# Patient Record
Sex: MALE | Race: WHITE | NOT HISPANIC OR LATINO | Employment: OTHER | URBAN - METROPOLITAN AREA
[De-identification: names, ages, dates, MRNs, and addresses within clinical notes are randomized per-mention and may not be internally consistent; named-entity substitution may affect disease eponyms.]

---

## 2017-04-18 ENCOUNTER — OFFICE VISIT (OUTPATIENT)
Dept: DERMATOLOGY | Facility: CLINIC | Age: 77
End: 2017-04-18

## 2017-04-18 VITALS — DIASTOLIC BLOOD PRESSURE: 78 MMHG | SYSTOLIC BLOOD PRESSURE: 123 MMHG | HEART RATE: 72 BPM

## 2017-04-18 DIAGNOSIS — Z79.899 ENCOUNTER FOR LONG-TERM (CURRENT) USE OF HIGH-RISK MEDICATION: ICD-10-CM

## 2017-04-18 DIAGNOSIS — L73.9 FOLLICULITIS: Primary | ICD-10-CM

## 2017-04-18 DIAGNOSIS — L73.9 FOLLICULITIS: ICD-10-CM

## 2017-04-18 LAB
ALBUMIN SERPL-MCNC: 3.6 G/DL (ref 3.4–5)
ALP SERPL-CCNC: 92 U/L (ref 40–150)
ALT SERPL W P-5'-P-CCNC: 31 U/L (ref 0–70)
ANION GAP SERPL CALCULATED.3IONS-SCNC: 6 MMOL/L (ref 3–14)
AST SERPL W P-5'-P-CCNC: 27 U/L (ref 0–45)
BILIRUB SERPL-MCNC: 1 MG/DL (ref 0.2–1.3)
BUN SERPL-MCNC: 14 MG/DL (ref 7–30)
CALCIUM SERPL-MCNC: 8.8 MG/DL (ref 8.5–10.1)
CHLORIDE SERPL-SCNC: 107 MMOL/L (ref 94–109)
CO2 SERPL-SCNC: 28 MMOL/L (ref 20–32)
CREAT SERPL-MCNC: 0.94 MG/DL (ref 0.66–1.25)
ERYTHROCYTE [DISTWIDTH] IN BLOOD BY AUTOMATED COUNT: 12 % (ref 10–15)
GFR SERPL CREATININE-BSD FRML MDRD: 78 ML/MIN/1.7M2
GLUCOSE SERPL-MCNC: 102 MG/DL (ref 70–99)
HCT VFR BLD AUTO: 44.9 % (ref 40–53)
HGB BLD-MCNC: 15.4 G/DL (ref 13.3–17.7)
MCH RBC QN AUTO: 33.1 PG (ref 26.5–33)
MCHC RBC AUTO-ENTMCNC: 34.3 G/DL (ref 31.5–36.5)
MCV RBC AUTO: 97 FL (ref 78–100)
PLATELET # BLD AUTO: 205 10E9/L (ref 150–450)
POTASSIUM SERPL-SCNC: 4.5 MMOL/L (ref 3.4–5.3)
PROT SERPL-MCNC: 6.4 G/DL (ref 6.8–8.8)
RBC # BLD AUTO: 4.65 10E12/L (ref 4.4–5.9)
SODIUM SERPL-SCNC: 141 MMOL/L (ref 133–144)
WBC # BLD AUTO: 6.3 10E9/L (ref 4–11)

## 2017-04-18 ASSESSMENT — PAIN SCALES - GENERAL: PAINLEVEL: NO PAIN (0)

## 2017-04-18 NOTE — LETTER
4/18/2017       RE: Tee Larkin  [address]  [address]  [facility] [address]     Dear Colleague,    Thank you for referring your patient, Tee Larkin, to the [facility] DERMATOLOGY at [facility]. Please see a copy of my visit note below.    DERMATOLOGY PROBLEM LIST:   1.  Pityrosporum folliculitis with rosacea component, involvement of scalp and face.  Pityrosporum biopsy proven many years ago.  Current treatment Bactrim double strength, will attempt to decrease to 1 pill every other day.  Repeat CBC and CMP today, 04/18/2017.  Ketoconazole 2% shampoo.   2.  Seborrheic dermatitis, 2% ketoconazole shampoo.      CHIEF COMPLAINT:  Followup.      HISTORY OF PRESENT ILLNESS:  The patient is a 77-year-old male well known to me who has had biopsy-proven pityrosporum folliculitis for many years.  He has done very well on Bactrim.  Attempts to wean off Bactrim completely in the past had resulted in a large flares.  There is a rosacea component.  He has trigger factors such as nuts which will bring out pimples.  Currently is taking Bactrim double strength 1 daily and ketoconazole shampoo.  He is doing well.  Even though he occasionally does indulge in some of his trigger factors, he has not had any flares.  He is tolerating the medication well, no GI upset, no difficulty with sunburns.      PHYSICAL EXAMINATION:  The patient is a well-appearing male in no acute distress.  Please note the nursing note for vital signs.  He has mild erythema of the scalp, no inflammatory papules, pustules or scale.  Face today has mild erythema of the nose with some sebaceous hyperplasia.        ASSESSMENT AND PLAN:   1.  Pityrosporum folliculitis with rosacea component.  At this point, over the past 6 months he has done well, we will decrease his Bactrim to 1 every other day.  I did write the prescription still for daily should he feel he needs to stay on it.  He will otherwise continue the  ketoconazole shampoo.   2.  Seborrheic dermatitis.  Continue the ketoconazole shampoo.  I will check safety labs today, a CBC and comprehensive metabolic panel, and then plan to see the patient back in 6 months.         Again, thank you for allowing me to participate in the care of your patient.      Sincerely,    Charlene Xiong MD

## 2017-04-18 NOTE — MR AVS SNAPSHOT
After Visit Summary   4/18/2017    Tee Larkin    MRN: [id number]           Patient Information     Date Of Birth          1940        Visit Information        Provider Department      4/18/2017 9:45 AM Charlene Xiong MD [facility] Dermatology        Today's Diagnoses     Folliculitis    -  1    Encounter for long-term (current) use of high-risk medication           Follow-ups after your visit        Your next 10 appointments already scheduled     Apr 18, 2017 10:30 AM CDT   LAB with  LAB   [facility] ([facility])    [address] [place] [id number] [phone]           Patient must bring picture ID.  Patient should be prepared to give a urine specimen  Please do not eat 10-12 hours before your appointment if you are coming in fasting for labs on lipids, cholesterol, or glucose (sugar).  Pregnant women should follow their Care Team instructions. Water with medications is okay. Do not drink coffee or other fluids.   If you have concerns about taking  your medications, please ask at office or if scheduling via HowGood, send a message by clicking on Secure Messaging, Message Your Care Team.              Future tests that were ordered for you today     Open Future Orders        Priority Expected Expires Ordered    CBC with platelets Routine  4/18/2018 4/18/2017    Comprehensive metabolic panel Routine  4/18/2018 4/18/2017            Who to contact     Please call your clinic at [phone] to:    Ask questions about your health    Make or cancel appointments    Discuss your medicines    Learn about your test results    Speak to your doctor   If you have compliments or concerns about an experience at your clinic, or if you wish to file a complaint, please contact [facility] Patient Relations at [phone] or email us at [email or web address]         Additional Information About Your Visit         Endocyte Information     Endocyte is an electronic gateway that provides easy, online access to your medical records. With Endocyte, you can request a clinic appointment, read your test results, renew a prescription or communicate with your care team.     To sign up for Endocyte visit the website at [email or web address]   You will be asked to enter the access code listed below, as well as some personal information. Please follow the directions to create your username and password.     Your access code is: [email or web address]  Expires: 7/3/2017  6:30 AM     Your access code will  in 90 days. If you need help or a new code, please contact your [facility] or call [phone] for assistance.        Care EveryWhere ID     This is your Care EveryWhere ID. This could be used by other organizations to access your [facility] medical records  [id number]        Your Vitals Were     Pulse                   72            Blood Pressure from Last 3 Encounters:   17 123/78   16 125/70   16 146/82    Weight from Last 3 Encounters:   16 86.6 kg (191 lb)   16 88.5 kg (195 lb 3.2 oz)   06/29/15 87.8 kg (193 lb 9.6 oz)               Primary Care Provider Office Phone # Fax #    Stephan Weiner [phone]       [facility] [address] [facility] [address]  [facility] [address]        Thank you!     Thank you for choosing [facility] DERMATOLOGY  for your care. Our goal is always to provide you with excellent care. Hearing back from our patients is one way we can continue to improve our services. Please take a few minutes to complete the written survey that you may receive in the mail after your visit with us. Thank you!             Your Updated Medication List - Protect others around you: Learn how to safely use, store and throw away your medicines at www.disposemymeds.org.          This list is accurate as of: 17 10:04 AM.  Always use your most recent  med list.                   Brand Name Dispense Instructions for use    aspirin 81 MG tablet      Take 81 mg by mouth daily.       clobetasol 0.05 % external solution    TEMOVATE    59 mL    Apply several drops over scalp to spread think layer-15 minutes before shower Twice weekly       FREE & CLEAR Sham      Externally apply  topically. As directed       ketoconazole 2 % shampoo    NIZORAL    120 mL    Use as shampoo daily       LOSARTAN POTASSIUM PO      Take  by mouth.       metoprolol 25 MG tablet    LOPRESSOR     Take 25 mg by mouth 2 times daily.       nitroglycerin 0.4 MG sublingual tablet    NITROSTAT     Place 0.4 mg under the tongue every 5 minutes as needed.       omeprazole 20 MG tablet      Take 20 mg by mouth daily.       rosuvastatin 40 MG tablet    CRESTOR     Take 40 mg by mouth daily.       sulfamethoxazole-trimethoprim 800-160 MG per tablet    BACTRIM DS/SEPTRA DS    30 tablet    Take 1 tablet by mouth daily

## 2017-04-18 NOTE — NURSING NOTE
"Dermatology Rooming Note    Tee Larkin's goals for this visit include:   Chief Complaint   Patient presents with     Derm Problem     Tee comes to clinic today for scalp folliculitis. States \"I think it's doing good.\"     Wilda Munoz, [facility]    "

## 2017-04-18 NOTE — PROGRESS NOTES
DERMATOLOGY PROBLEM LIST:   1.  Pityrosporum folliculitis with rosacea component, involvement of scalp and face.  Pityrosporum biopsy proven many years ago.  Current treatment Bactrim double strength, will attempt to decrease to 1 pill every other day.  Repeat CBC and CMP today, 04/18/2017.  Ketoconazole 2% shampoo.   2.  Seborrheic dermatitis, 2% ketoconazole shampoo.      CHIEF COMPLAINT:  Followup.      HISTORY OF PRESENT ILLNESS:  The patient is a 77-year-old male well known to me who has had biopsy-proven pityrosporum folliculitis for many years.  He has done very well on Bactrim.  Attempts to wean off Bactrim completely in the past had resulted in a large flares.  There is a rosacea component.  He has trigger factors such as nuts which will bring out pimples.  Currently is taking Bactrim double strength 1 daily and ketoconazole shampoo.  He is doing well.  Even though he occasionally does indulge in some of his trigger factors, he has not had any flares.  He is tolerating the medication well, no GI upset, no difficulty with sunburns.      PHYSICAL EXAMINATION:  The patient is a well-appearing male in no acute distress.  Please note the nursing note for vital signs.  He has mild erythema of the scalp, no inflammatory papules, pustules or scale.  Face today has mild erythema of the nose with some sebaceous hyperplasia.        ASSESSMENT AND PLAN:   1.  Pityrosporum folliculitis with rosacea component.  At this point, over the past 6 months he has done well, we will decrease his Bactrim to 1 every other day.  I did write the prescription still for daily should he feel he needs to stay on it.  He will otherwise continue the ketoconazole shampoo.   2.  Seborrheic dermatitis.  Continue the ketoconazole shampoo.  I will check safety labs today, a CBC and comprehensive metabolic panel, and then plan to see the patient back in 6 months.

## 2017-04-18 NOTE — PROGRESS NOTES
"[facility] Dermatology Note      Dermatology Problem List:  1. Pityrosporum folliculitis with a rosacea component  -Scalp and face (pityrosporum bx proven many years ago)  -Continue bactrim DS, 1 pill QDay (pt has previously been off Bactrim and has been on higher doses)   -Repeat CBC and CMP at next visit (March 2017)  -Ketoconazole 2% shampoo  -No specific medications for rosacea at this time (his rosacea flares with chocolate and nuts)    2. Seb derm  -2% ketoconazole shampoo    Encounter Date: Apr 18, 2017    CC:   Chief Complaint   Patient presents with     Derm Problem     Tee comes to clinic today for scalp folliculitis. States \"I think it's doing good.\"         History of Present Illness:  Mr. Tee Larkin is a 77 year old male who presents as a follow-up for scalp folliculitis. The patient was last seen in March 2016. Since his last visit,t he pt states his dz is well controlled with Bactrim and ketoconazole shampoo. The pt states he is using the shampoo daily, and he leaves it in for approximately 5 minutes. The pt states the last time he noticed any bumps on his scalp was over a month ago, but even then, there were not that many. Overall, pt feels well, and he denies any other skin complaints today.     Past Medical History:   Patient Active Problem List   Diagnosis     Other specified disease of hair and hair follicles     History reviewed. No pertinent past medical history.  History reviewed. No pertinent surgical history.    Medications:  Current Outpatient Prescriptions   Medication Sig Dispense Refill     ketoconazole (NIZORAL) 2 % shampoo Use as shampoo daily 120 mL 11     sulfamethoxazole-trimethoprim (BACTRIM DS,SEPTRA DS) 800-160 MG per tablet Take 1 tablet by mouth daily 30 tablet 5     clobetasol (TEMOVATE) 0.05 % external solution Apply several drops over scalp to spread think layer-15 minutes before shower Twice weekly 59 mL 6     aspirin 81 MG tablet Take 81 mg by " mouth daily.       LOSARTAN POTASSIUM PO Take  by mouth.       rosuvastatin (CRESTOR) 40 MG tablet Take 40 mg by mouth daily.       nitroGLYCERIN (NITROSTAT) 0.4 MG SL tablet Place 0.4 mg under the tongue every 5 minutes as needed.       Shampoos (FREE & CLEAR) SHAM Externally apply  topically. As directed       metoprolol (LOPRESSOR) 25 MG tablet Take 25 mg by mouth 2 times daily.       Omeprazole 20 MG tablet Take 20 mg by mouth daily.          Allergies   Allergen Reactions     Penicillins Hives     Chocolate Rash     Nuts Rash       Review of Systems:  -As per HPI  -Constitutional: The patient denies fatigue, fevers, chills, unintended weight loss, and night sweats.  -HEENT: Patient denies nonhealing oral sores.  -Skin: As above in HPI. No additional skin concerns.    Physical exam:  Vitals: /78 (BP Location: Right arm, Patient Position: Chair, Cuff Size: Adult Regular)  Pulse 72  GEN: This is a well developed, well-nourished male in no acute distress, in a pleasant mood.    SKIN: Focused examination of the scalp and face was performed.  -No papules or pustules on the scalp are appreciated. There is mild erythema of the scalp skin with minimal seborrhea  -No erythema or scaling of the face is appreciated.  -No other lesions of concern on areas examined.     Impression/Plan:  1. Pityrosporum folliculitis with a rosacea component    Because pt had a flare a month ago, we do not feel comfortable stopping the Bactrim at this time    Continue Bactrim DS one pill QDay and ketoconazole 2% shampoo    No specific medications for rosacea at this time (his rosacea is triggered by chocolate and nuts)    2. Seborrheic dermatitis    Benign nature was discussed. No further intervention required at this time.     Continue 2% ketoconazole shampoo      Follow-up in 6 months, earlier for new or changing lesions.        staffed the patient.    Staff Involved:  Resident(Phil Crump)/Staff(as above)  .I was  present for key portions of the history and exam.  See resident note for pertinent history, exam, and treatment plan.  Charlene Xiong MD

## 2017-05-31 DIAGNOSIS — L73.8 BACTERIAL FOLLICULITIS: ICD-10-CM

## 2017-05-31 RX ORDER — SULFAMETHOXAZOLE/TRIMETHOPRIM 800-160 MG
1 TABLET ORAL DAILY
Qty: 30 TABLET | Refills: 5 | OUTPATIENT
Start: 2017-05-31

## 2017-05-31 NOTE — TELEPHONE ENCOUNTER
Last visit: 4/18/17  Next visit: Recall for 10/18/17    ASSESSMENT AND PLAN:   1.  Pityrosporum folliculitis with rosacea component.  At this point, over the past 6 months he has done well, we will decrease his Bactrim to 1 every other day.  I did write the prescription still for daily should he feel he needs to stay on it.  He will otherwise continue the ketoconazole shampoo.   2.  Seborrheic dermatitis.  Continue the ketoconazole shampoo.  I will check safety labs today, a CBC and comprehensive metabolic panel, and then plan to see the patient back in 6 months.

## 2017-05-31 NOTE — TELEPHONE ENCOUNTER
If pt has ongoing folliculitis, he should see his doctor. Bactrim is not a long term solution. Please schedule sooner f/u

## 2017-06-05 NOTE — TELEPHONE ENCOUNTER
Daniel,  Please reorder (per Dr. Xiong)    Last visit: 4/18/17  Next visit: Recall for 10/18/17     ASSESSMENT AND PLAN:   1.  Pityrosporum folliculitis with rosacea component.  At this point, over the past 6 months he has done well, we will decrease his Bactrim to 1 every other day.  I did write the prescription still for daily should he feel he needs to stay on it.  He will otherwise continue the ketoconazole shampoo.   2.  Seborrheic dermatitis.  Continue the ketoconazole shampoo.  I will check safety labs today, a CBC and comprehensive metabolic panel, and then plan to see the patient back in 6 months.

## 2017-06-06 RX ORDER — SULFAMETHOXAZOLE/TRIMETHOPRIM 800-160 MG
1 TABLET ORAL DAILY
Qty: 30 TABLET | Refills: 5 | Status: SHIPPED | OUTPATIENT
Start: 2017-06-06 | End: 2017-10-31

## 2017-06-27 ENCOUNTER — TELEPHONE (OUTPATIENT)
Dept: DERMATOLOGY | Facility: CLINIC | Age: 77
End: 2017-06-27

## 2017-06-27 NOTE — TELEPHONE ENCOUNTER
I called to get magda scheduled with Dr. LAWTON. He did not answer but I left a VM for him to give us a call back. July 3rd 2 9:15 tell pt to come at 9:00

## 2017-06-27 NOTE — TELEPHONE ENCOUNTER
----- Message from Charlene Xiong MD sent at 6/27/2017 12:44 PM CDT -----  Regarding: RE: Pt Appt Request - Dr Xiong  Contact: [phone]  Ok to overbook  ----- Message -----     From: Rosa Goldsmith CMA     Sent: 6/27/2017  10:43 AM       To: Charlene Xiong MD  Subject: FW: Pt Appt Request - Dr Xiong                Would you like to have him added on?  ----- Message -----     From: Lizett Ko CMA     Sent: 6/27/2017  10:34 AM       To: Rosa Goldsmith CMA  Subject: FW: Pt Appt Request - Dr Xiong                    ----- Message -----     From: Alida Mckee     Sent: 6/27/2017   9:51 AM       To: Derm Triage-Ump  Subject: Pt Appt Request - Dr Xiong                    The pt needs to see Dr Xiong regarding his Bactrim use. He was taking Bactrim and developed a cough while in [place] so he quit taking it. He coughed for 4 weeks then it ended, so he started the Bactrim again.  He thinks he should be seen as he is running out of Bactrim and he wants to know if it should be renewed, etc.  Please call the pt at [phone]    Servando Irwin    Please DO NOT send this message and/or reply back to sender.  Call Center Representatives DO NOT respond to messages.

## 2017-06-28 ENCOUNTER — TELEPHONE (OUTPATIENT)
Dept: DERMATOLOGY | Facility: CLINIC | Age: 77
End: 2017-06-28

## 2017-06-28 NOTE — TELEPHONE ENCOUNTER
Patient states he was taking medication and later had developed a cough so he stopped taking but the cough persisted for a couple weeks after discontinuing. Patient's scalp folliculitis recurred from not taking medication so once cough cleared patient began taking medication again. No cough this time. Patient states he is out of medication but still has refills and was wondering if he needed to speak with Dr Xiong before continuing with medication or if it is okay for him to just refill med. Writer spoke with [facility] Pharmacist who states cough is a very rare side effect and circumstance would be hard to say it was medication since cough did not go away with discontinuation and did not come back after re-starting. Patient was going to ask his pharmacy questions and request a refill.    Routing to provider to ensure okay for patient to continue medication.

## 2017-10-31 ENCOUNTER — OFFICE VISIT (OUTPATIENT)
Dept: DERMATOLOGY | Facility: CLINIC | Age: 77
End: 2017-10-31

## 2017-10-31 DIAGNOSIS — L73.8 BACTERIAL FOLLICULITIS: Primary | ICD-10-CM

## 2017-10-31 DIAGNOSIS — L71.9 ACNE ROSACEA: ICD-10-CM

## 2017-10-31 DIAGNOSIS — L73.8 BACTERIAL FOLLICULITIS: ICD-10-CM

## 2017-10-31 DIAGNOSIS — L21.9 DERMATITIS, SEBORRHEIC: ICD-10-CM

## 2017-10-31 LAB
ALBUMIN SERPL-MCNC: 3.6 G/DL (ref 3.4–5)
ALP SERPL-CCNC: 87 U/L (ref 40–150)
ALT SERPL W P-5'-P-CCNC: 36 U/L (ref 0–70)
ANION GAP SERPL CALCULATED.3IONS-SCNC: 4 MMOL/L (ref 3–14)
AST SERPL W P-5'-P-CCNC: 26 U/L (ref 0–45)
BASOPHILS # BLD AUTO: 0.1 10E9/L (ref 0–0.2)
BASOPHILS NFR BLD AUTO: 0.7 %
BILIRUB SERPL-MCNC: 0.6 MG/DL (ref 0.2–1.3)
BUN SERPL-MCNC: 14 MG/DL (ref 7–30)
CALCIUM SERPL-MCNC: 8.5 MG/DL (ref 8.5–10.1)
CHLORIDE SERPL-SCNC: 106 MMOL/L (ref 94–109)
CO2 SERPL-SCNC: 29 MMOL/L (ref 20–32)
CREAT SERPL-MCNC: 0.86 MG/DL (ref 0.66–1.25)
DIFFERENTIAL METHOD BLD: NORMAL
EOSINOPHIL # BLD AUTO: 0.2 10E9/L (ref 0–0.7)
EOSINOPHIL NFR BLD AUTO: 3.1 %
ERYTHROCYTE [DISTWIDTH] IN BLOOD BY AUTOMATED COUNT: 12.2 % (ref 10–15)
GFR SERPL CREATININE-BSD FRML MDRD: 86 ML/MIN/1.7M2
GLUCOSE SERPL-MCNC: 82 MG/DL (ref 70–99)
HCT VFR BLD AUTO: 46.3 % (ref 40–53)
HGB BLD-MCNC: 15.4 G/DL (ref 13.3–17.7)
IMM GRANULOCYTES # BLD: 0.1 10E9/L (ref 0–0.4)
IMM GRANULOCYTES NFR BLD: 0.7 %
LYMPHOCYTES # BLD AUTO: 1.2 10E9/L (ref 0.8–5.3)
LYMPHOCYTES NFR BLD AUTO: 16.5 %
MCH RBC QN AUTO: 33 PG (ref 26.5–33)
MCHC RBC AUTO-ENTMCNC: 33.3 G/DL (ref 31.5–36.5)
MCV RBC AUTO: 99 FL (ref 78–100)
MONOCYTES # BLD AUTO: 0.8 10E9/L (ref 0–1.3)
MONOCYTES NFR BLD AUTO: 11.4 %
NEUTROPHILS # BLD AUTO: 4.7 10E9/L (ref 1.6–8.3)
NEUTROPHILS NFR BLD AUTO: 67.6 %
NRBC # BLD AUTO: 0 10*3/UL
NRBC BLD AUTO-RTO: 0 /100
PLATELET # BLD AUTO: 209 10E9/L (ref 150–450)
POTASSIUM SERPL-SCNC: 4.5 MMOL/L (ref 3.4–5.3)
PROT SERPL-MCNC: 6.4 G/DL (ref 6.8–8.8)
RBC # BLD AUTO: 4.67 10E12/L (ref 4.4–5.9)
SODIUM SERPL-SCNC: 140 MMOL/L (ref 133–144)
WBC # BLD AUTO: 7 10E9/L (ref 4–11)

## 2017-10-31 RX ORDER — SULFAMETHOXAZOLE/TRIMETHOPRIM 800-160 MG
0.5 TABLET ORAL DAILY
Qty: 30 TABLET | Refills: 5 | Status: SHIPPED | OUTPATIENT
Start: 2017-10-31 | End: 2018-02-05

## 2017-10-31 ASSESSMENT — PAIN SCALES - GENERAL: PAINLEVEL: NO PAIN (0)

## 2017-10-31 NOTE — PROGRESS NOTES
[facility] Dermatology Note      Dermatology Problem List:  1. Pityrosporum Folliculitis with rosacea component, involvement of scalp and face.   - Pityrosporum biopsy proven many years ago.  Bactrim DS 1 pill every day but will try to decrease to every other day.    - Last CBC and CMP, 04/18/2017, repeat today    - Ketoconazole 2% shampoo.     2.  Seborrheic dermatitis   - 2% ketoconazole shampoo.    Encounter Date: Oct 31, 2017    CC:   Chief Complaint   Patient presents with     Derm Problem     Tee is here for a folliculitis follow up.  States he notices a flare up on his scalp and face when he eat nuts.           History of Present Illness:  Mr. Tee Larkin is a 77 year old male who presents as a follow-up for pityrosporum folliculitis. The patient was last seen 4/17 when we asked him to decrease to Bactrim DS every other day though he does not recall this. He has been noticing some increasing memory difficulty recently and this is being evaluated by his PCP for this. 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 He called in late June saying that he flared after stopping the bactrim because of a cold. He does not recall this. He reports that overall his folliculitis, rosacea, and seborhheic dermatitis are all under good control, doing well with no scaling or itching of the scalp or face. He has no new concerns today.     Past Medical History:   Patient Active Problem List   Diagnosis     Other specified disease of hair and hair follicles     No past medical history on file.  No past surgical history on file.    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 The patient reports sun avoidance    Family History:  There is possible family history of skin cancer in his father.    Medications:  Current Outpatient Prescriptions   Medication Sig Dispense Refill     sulfamethoxazole-trimethoprim (BACTRIM DS/SEPTRA DS) 800-160 MG per tablet Take 1 tablet by mouth daily  30 tablet 5     ketoconazole (NIZORAL) 2 % shampoo Use as shampoo daily 120 mL 11     clobetasol (TEMOVATE) 0.05 % external solution Apply several drops over scalp to spread think layer-15 minutes before shower Twice weekly 59 mL 6     aspirin 81 MG tablet Take 81 mg by mouth daily.       LOSARTAN POTASSIUM PO Take  by mouth.       rosuvastatin (CRESTOR) 40 MG tablet Take 40 mg by mouth daily.       nitroGLYCERIN (NITROSTAT) 0.4 MG SL tablet Place 0.4 mg under the tongue every 5 minutes as needed.       Shampoos (FREE & CLEAR) SHAM Externally apply  topically. As directed       metoprolol (LOPRESSOR) 25 MG tablet Take 25 mg by mouth 2 times daily.       Omeprazole 20 MG tablet Take 20 mg by mouth daily.          Allergies   Allergen Reactions     Penicillins Hives     Chocolate Rash     Nuts Rash         Review of Systems:  -As per HPI  -Constitutional: The patient denies fatigue, fevers, chills, unintended weight loss, and night sweats.  -HEENT: Patient denies nonhealing oral sores.  -Skin: As above in HPI. No additional skin concerns.    Physical exam:  Vitals: There were no vitals taken for this visit.  GEN: This is a well developed, well-nourished, well-kempt male in no acute distress, in a pleasant mood.    SKIN: Focused examination of the face, scalp and hands was performed.  -there is no scaling, erythema or excoriations/erosions/papules on the scalp or face  -No other lesions of concern on areas examined.     Impression/Plan:  1. Pityrosporum Folliculitis with rosacea component, involvement of scalp and face. Improved   - Pityrosporum biopsy proven many years ago.  Bactrim DS 1 pill every day but will try to decrease to every other day.  (order changed to help with pt confusion/memory issues)   - Last CBC and CMP, 04/18/2017, repeat today    - Ketoconazole 2% shampoo.     2.  Seborrheic dermatitis, improved   - 2% ketoconazole shampoo.    Follow-up in 6 months, earlier for new or changing lesions.       Tyrese staffed the patient.    Staff Involved:  Resident(Berta Mckeon)/Staff(as above)  .I, Charlene Xiong MD, saw this patient with the resident and agree with the resident s findings and plan of care as documented in the resident s note.

## 2017-10-31 NOTE — LETTER
10/31/2017       RE: Tee Larkin  [address]  [address]  [facility] [address]     Dear Colleague,    Thank you for referring your patient, Tee Larkin, to the [facility] DERMATOLOGY at [facility]. Please see a copy of my visit note below.      [facility] Dermatology Note      Dermatology Problem List:  1. Pityrosporum Folliculitis with rosacea component, involvement of scalp and face.   - Pityrosporum biopsy proven many years ago.  Bactrim DS 1 pill every day but will try to decrease to every other day.    - Last CBC and CMP, 04/18/2017, repeat today    - Ketoconazole 2% shampoo.     2.  Seborrheic dermatitis   - 2% ketoconazole shampoo.    Encounter Date: Oct 31, 2017    CC:   Chief Complaint   Patient presents with     Derm Problem     Tee is here for a folliculitis follow up.  States he notices a flare up on his scalp and face when he eat nuts.           History of Present Illness:  Mr. Tee Larkin is a 77 year old male who presents as a follow-up for pityrosporum folliculitis. The patient was last seen 4/17 when we asked him to decrease to Bactrim DS every other day though he does not recall this. He has been noticing some increasing memory difficulty recently and this is being evaluated by his PCP for this. 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 He called in late June saying that he flared after stopping the bactrim because of a cold. He does not recall this. He reports that overall his folliculitis, rosacea, and seborhheic dermatitis are all under good control, doing well with no scaling or itching of the scalp or face. He has no new concerns today.     Past Medical History:   Patient Active Problem List   Diagnosis     Other specified disease of hair and hair follicles     No past medical history on file.  No past surgical history on file.    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 The patient  reports sun avoidance    Family History:  There is possible family history of skin cancer in his father.    Medications:  Current Outpatient Prescriptions   Medication Sig Dispense Refill     sulfamethoxazole-trimethoprim (BACTRIM DS/SEPTRA DS) 800-160 MG per tablet Take 1 tablet by mouth daily 30 tablet 5     ketoconazole (NIZORAL) 2 % shampoo Use as shampoo daily 120 mL 11     clobetasol (TEMOVATE) 0.05 % external solution Apply several drops over scalp to spread think layer-15 minutes before shower Twice weekly 59 mL 6     aspirin 81 MG tablet Take 81 mg by mouth daily.       LOSARTAN POTASSIUM PO Take  by mouth.       rosuvastatin (CRESTOR) 40 MG tablet Take 40 mg by mouth daily.       nitroGLYCERIN (NITROSTAT) 0.4 MG SL tablet Place 0.4 mg under the tongue every 5 minutes as needed.       Shampoos (FREE & CLEAR) SHAM Externally apply  topically. As directed       metoprolol (LOPRESSOR) 25 MG tablet Take 25 mg by mouth 2 times daily.       Omeprazole 20 MG tablet Take 20 mg by mouth daily.          Allergies   Allergen Reactions     Penicillins Hives     Chocolate Rash     Nuts Rash         Review of Systems:  -As per HPI  -Constitutional: The patient denies fatigue, fevers, chills, unintended weight loss, and night sweats.  -HEENT: Patient denies nonhealing oral sores.  -Skin: As above in HPI. No additional skin concerns.    Physical exam:  Vitals: There were no vitals taken for this visit.  GEN: This is a well developed, well-nourished, well-kempt male in no acute distress, in a pleasant mood.    SKIN: Focused examination of the face, scalp and hands was performed.  -there is no scaling, erythema or excoriations/erosions/papules on the scalp or face  -No other lesions of concern on areas examined.     Impression/Plan:  1. Pityrosporum Folliculitis with rosacea component, involvement of scalp and face. Improved   - Pityrosporum biopsy proven many years ago.  Bactrim DS 1 pill every day but will try to decrease  to every other day.  (order changed to help with pt confusion/memory issues)   - Last CBC and CMP, 04/18/2017, repeat today    - Ketoconazole 2% shampoo.     2.  Seborrheic dermatitis, improved   - 2% ketoconazole shampoo.    Follow-up in 6 months, earlier for new or changing lesions.     Dr. Xiong staffed the patient.    Staff Involved:  Resident(Berta Mckeon)/Staff(as above)  .I, Charlene Xiong MD, saw this patient with the resident and agree with the resident s findings and plan of care as documented in the resident s note.      Again, thank you for allowing me to participate in the care of your patient.      Sincerely,    Charlene Xiong MD

## 2017-10-31 NOTE — MR AVS SNAPSHOT
After Visit Summary   10/31/2017    Tee Larkin    MRN: [id number]           Patient Information     Date Of Birth          1940        Visit Information        Provider Department      10/31/2017 9:15 AM Charlene Xiong MD [facility] Dermatology        Today's Diagnoses     Bacterial folliculitis    -  1      Care Instructions    Take a half pill of bactrim per day every day          Follow-ups after your visit        Future tests that were ordered for you today     Open Future Orders        Priority Expected Expires Ordered    CBC with platelets differential Routine  10/31/2018 10/31/2017    Comprehensive metabolic panel Routine  10/31/2018 10/31/2017            Who to contact     Please call your clinic at [phone] to:    Ask questions about your health    Make or cancel appointments    Discuss your medicines    Learn about your test results    Speak to your doctor   If you have compliments or concerns about an experience at your clinic, or if you wish to file a complaint, please contact [facility] Patient Relations at [phone] or email us at [email or web address]         Additional Information About Your Visit        MyChart Information     [employer] is an electronic gateway that provides easy, online access to your medical records. With [employer], you can request a clinic appointment, read your test results, renew a prescription or communicate with your care team.     To sign up for [employer] visit the website at [email or web address]   You will be asked to enter the access code listed below, as well as some personal information. Please follow the directions to create your username and password.     Your access code is: [email or web address]  Expires: 1/15/2018  6:31 AM     Your access code will  in 90 days. If you need help or a new code, please contact your [facility] or call [phone] for assistance.         Care EveryWhere ID     This is your Care EveryWhere ID. This could be used by other organizations to access your [place] medical records  [id number]         Blood Pressure from Last 3 Encounters:   04/18/17 123/78   09/21/16 125/70   03/30/16 146/82    Weight from Last 3 Encounters:   09/21/16 86.6 kg (191 lb)   03/30/16 88.5 kg (195 lb 3.2 oz)   06/29/15 87.8 kg (193 lb 9.6 oz)               Primary Care Provider Office Phone # Fax #    Stephan Weiner [phone]       [facility] [address]  [facility] [address]        Equal Access to Services     LUCINDA GAY : Hadii aad ku hadasho Soomaali, waaxda luqadaha, qaybta kaalmada adeegyada, waxay idiin hayadn jeannette sanchez . So [facility] [phone].    ATENCIÓN: Si habla español, tiene a baker disposición servicios gratuitos de asistencia lingüística. [facility] [phone].    We comply with applicable federal civil rights laws and [place] laws. We do not discriminate on the basis of race, color, national origin, age, disability, sex, sexual orientation, or gender identity.            Thank you!     Thank you for choosing [facility] DERMATOLOGY  for your care. Our goal is always to provide you with excellent care. Hearing back from our patients is one way we can continue to improve our services. Please take a few minutes to complete the written survey that you may receive in the mail after your visit with us. Thank you!             Your Updated Medication List - Protect others around you: Learn how to safely use, store and throw away your medicines at www.disposemymeds.org.          This list is accurate as of: 10/31/17  9:47 AM.  Always use your most recent med list.                   Brand Name Dispense Instructions for use Diagnosis    aspirin 81 MG tablet      Take 81 mg by mouth daily.        clobetasol 0.05 % external solution    TEMOVATE    59 mL    Apply several drops over scalp to spread think layer-15 minutes  before shower Twice weekly    Folliculitis       FREE & CLEAR Sham      Externally apply  topically. As directed        ketoconazole 2 % shampoo    NIZORAL    120 mL    Use as shampoo daily    Folliculitis       LOSARTAN POTASSIUM PO      Take  by mouth.        metoprolol 25 MG tablet    LOPRESSOR     Take 25 mg by mouth 2 times daily.        nitroGLYcerin 0.4 MG sublingual tablet    NITROSTAT     Place 0.4 mg under the tongue every 5 minutes as needed.        omeprazole 20 MG tablet      Take 20 mg by mouth daily.        rosuvastatin 40 MG tablet    CRESTOR     Take 40 mg by mouth daily.        sulfamethoxazole-trimethoprim 800-160 MG per tablet    BACTRIM DS/SEPTRA DS    30 tablet    Take 1 tablet by mouth daily    Bacterial folliculitis

## 2017-10-31 NOTE — NURSING NOTE
Dermatology Rooming Note    Tee Larkin's goals for this visit include:   Chief Complaint   Patient presents with     Derm Problem     Tee is here for a folliculitis follow up.  States he notices a flare up on his scalp and face when he eat nuts.         Rosetta Jensen LPN

## 2018-02-05 ENCOUNTER — TELEPHONE (OUTPATIENT)
Dept: DERMATOLOGY | Facility: CLINIC | Age: 78
End: 2018-02-05

## 2018-02-05 DIAGNOSIS — L73.8 BACTERIAL FOLLICULITIS: ICD-10-CM

## 2018-02-05 RX ORDER — SULFAMETHOXAZOLE/TRIMETHOPRIM 800-160 MG
1 TABLET ORAL DAILY
Qty: 90 TABLET | Refills: 1 | Status: SHIPPED | OUTPATIENT
Start: 2018-02-05 | End: 2018-07-11

## 2018-02-05 NOTE — TELEPHONE ENCOUNTER
Patient's bactrim dose was decreased to 1/2 tab at last visit on 10/31. He feels that he started getting the pustule rash not long after. He would like to go back on 1 tab daily. His pharmacy is asking for a new script to be sent.    Routing to provider for consideration.      Impression/Plan:  1. Pityrosporum Folliculitis with rosacea component, involvement of scalp and face. Improved                        - Pityrosporum biopsy proven many years ago.  Bactrim DS 1 pill every day but will try to decrease to every other day.  (order changed to help with pt confusion/memory issues)                        - Last CBC and CMP, 04/18/2017, repeat today                         - Ketoconazole 2% shampoo.      2.  Seborrheic dermatitis, improved                        - 2% ketoconazole shampoo.     Follow-up in 6 months, earlier for new or changing lesions.

## 2018-03-15 LAB
CHOLEST SERPL-MCNC: NORMAL MG/DL
HDLC SERPL-MCNC: NORMAL MG/DL
LDLC SERPL CALC-MCNC: NORMAL MG/DL
NONHDLC SERPL-MCNC: NORMAL MG/DL
TRIGL SERPL-MCNC: NORMAL MG/DL

## 2018-07-11 DIAGNOSIS — L73.8 BACTERIAL FOLLICULITIS: ICD-10-CM

## 2018-07-11 RX ORDER — SULFAMETHOXAZOLE/TRIMETHOPRIM 800-160 MG
1 TABLET ORAL DAILY
Qty: 90 TABLET | Refills: 1 | Status: SHIPPED | OUTPATIENT
Start: 2018-07-11 | End: 2019-01-14

## 2018-07-11 NOTE — TELEPHONE ENCOUNTER
Last visit: 10/31/2017   Next visit: none    Impression/Plan:  1. Pityrosporum Folliculitis with rosacea component, involvement of scalp and face. Improved                        - Pityrosporum biopsy proven many years ago.  Bactrim DS 1 pill every day but will try to decrease to every other day.  (order changed to help with pt confusion/memory issues)                        - Last CBC and CMP, 04/18/2017, repeat today                         - Ketoconazole 2% shampoo.      2.  Seborrheic dermatitis, improved                        - 2% ketoconazole shampoo.     Follow-up in 6 months, earlier for new or changing lesions.      Dr. Xiong staffed the patient.

## 2018-08-03 ENCOUNTER — TELEPHONE (OUTPATIENT)
Dept: DERMATOLOGY | Facility: CLINIC | Age: 78
End: 2018-08-03

## 2018-08-03 ENCOUNTER — OFFICE VISIT (OUTPATIENT)
Dept: DERMATOLOGY | Facility: CLINIC | Age: 78
End: 2018-08-03
Payer: COMMERCIAL

## 2018-08-03 DIAGNOSIS — L82.0 INFLAMED SEBORRHEIC KERATOSIS: Primary | ICD-10-CM

## 2018-08-03 ASSESSMENT — ACTIVITIES OF DAILY LIVING (ADL)
BATHING: 0 - INDEPENDENT
COMMUNICATION: 0 - UNDERSTANDS/COMMUNICATES WITHOUT DIFFICULTY
RETIRED_COMMUNICATION: 0-->UNDERSTANDS/COMMUNICATES WITHOUT DIFFICULTY
COGNITION: 0 - NO COGNITION ISSUES REPORTED
AMBULATION: 0-->INDEPENDENT
DRESS: 0 - INDEPENDENT
FALL_HISTORY_WITHIN_LAST_SIX_MONTHS: NO
EATING: 0 - INDEPENDENT
DRESS: 0-->INDEPENDENT
SWALLOWING: 0 - SWALLOWS FOODS/LIQUIDS WITHOUT DIFFICULTY
TOILETING: 0 - INDEPENDENT
RETIRED_EATING: 0-->INDEPENDENT
TOILETING: 0-->INDEPENDENT
TRANSFERRING: 0 - INDEPENDENT
TRANSFERRING: 0-->INDEPENDENT
SWALLOWING: 0-->SWALLOWS FOODS/LIQUIDS WITHOUT DIFFICULTY
AMBULATION: 0 - INDEPENDENT
CHANGE_IN_FUNCTIONAL_STATUS_SINCE_ONSET_OF_CURRENT_ILLNESS/INJURY: NO
BATHING: 0-->INDEPENDENT

## 2018-08-03 ASSESSMENT — PAIN SCALES - GENERAL: PAINLEVEL: NO PAIN (0)

## 2018-08-03 NOTE — PROGRESS NOTES
[facility] Dermatology Note      Dermatology Problem List:    1. Pityrosporum Folliculitis with rosacea component, involvement of scalp and face.                        - Pityrosporum biopsy proven many years ago.  Bactrim DS 1 pill every day but will try to decrease to every other day.                         - Last CBC and CMP, 04/18/2017                        - Ketoconazole 2% shampoo.   2.  Seborrheic dermatitis                        - 2% ketoconazole shampoo.    - Cryotherapy to lesion on left ere on 8/3/18    CC:   Derm Problem (Tee is visiting discuss area on ear. )    Encounter Date: Aug 3, 2018    History of Present Illness:  Mr. Tee Larkin is a 78 year old male who presents as a referral from Self.  Area on left ear that came up about a week ago. Had used a washcloth to dab it and it started bleeding. Denies trauma to the area. No pain preceding it. Noticed it only after bleeding and saw the area. Never had anything similar. No other areas that are new. Has a skin check later this month but because concerned about the fast growing area wanted to see someone more urgently.     Past Medical History:   Patient Active Problem List   Diagnosis     Other specified disease of hair and hair follicles     History reviewed. No pertinent past medical history.    Allergy History:     Allergies   Allergen Reactions     Penicillins Hives     Chocolate Rash     Nuts Rash       Social History:  The patient is retired. Patient has the following hobbies or non-occupational exposure: none    Lots of sunburns including some that blistered as a kid  Now wears sunscreen     reports that he has quit smoking. His smoking use included Cigarettes. He has never used smokeless tobacco.      Family History:  Family History   Problem Relation Age of Onset     Cancer No family hx of      no skin cancer     Skin Cancer No family hx of      Melanoma No family hx of    Grandmother with some sort of skin  cancer    Medications:  Current Outpatient Prescriptions   Medication Sig Dispense Refill     aspirin 81 MG tablet Take 81 mg by mouth daily.       clobetasol (TEMOVATE) 0.05 % external solution Apply several drops over scalp to spread think layer-15 minutes before shower Twice weekly 59 mL 6     ketoconazole (NIZORAL) 2 % shampoo Use as shampoo daily 120 mL 11     LOSARTAN POTASSIUM PO Take  by mouth.       metoprolol (LOPRESSOR) 25 MG tablet Take 25 mg by mouth 2 times daily.       nitroGLYCERIN (NITROSTAT) 0.4 MG SL tablet Place 0.4 mg under the tongue every 5 minutes as needed.       Omeprazole 20 MG tablet Take 20 mg by mouth daily.       rosuvastatin (CRESTOR) 40 MG tablet Take 40 mg by mouth daily.       Shampoos (FREE & CLEAR) SHAM Externally apply  topically. As directed       sulfamethoxazole-trimethoprim (BACTRIM DS/SEPTRA DS) 800-160 MG per tablet Take 1 tablet by mouth daily 90 tablet 1           Review of Systems:  -As per HPI  -Constitutional: The patient denies fatigue, fevers, chills, unintended weight loss, and night sweats.  -HEENT: Patient denies nonhealing oral sores.  -Skin: As above in HPI. No additional skin concerns.    Physical exam:  Vitals: There were no vitals taken for this visit.  GEN: This is a well developed, well-nourished male in no acute distress, in a pleasant mood.    SKIN: Focused examination of the Ear and face was performed.  - stuck on tan papule on ear with scale overlaying   - No other lesions of concern on areas examined.     Impression/Plan:    1) Inflamed seborrheic keratosis  Noticed about a week ago with bleeding and enlargement. No pain or other symptoms but given inflamed, electing to treat with cryotherapy today  Cryotherapy procedure note: After verbal consent and discussion of risks and benefits including but no limited to dyspigmentation/scar, blister, and pain, 1 was(were) treated with 1-2mm freeze border for 2 cycles with liquid nitrogen. Post cryotherapy  instructions were provided.     Follow-up with Dr Xiong as scheduled - focused visit today for this problematic lesion    Pt seen and discussed with Dr. Tabitha Benton MD  IM resident, PGY2    Staff Physician Comments:   I saw and evaluated the patient with the resident and I agree with the assessment and plan.  I was present for the entire minor procedure and examination (performed it personally).    Mark Lu MD  Dermatology Staff Physician  , Department of Dermatology

## 2018-08-03 NOTE — TELEPHONE ENCOUNTER
[facility] Call Center    Phone Message    May a detailed message be left on voicemail: no    Reason for Call: Other: I scheduled PT of Dr. Chavarria with Tabitha for today at 1:00pm for a growth on ear     Action Taken: Message routed to:  [facility] ([facility] Derm

## 2018-08-03 NOTE — MR AVS SNAPSHOT
After Visit Summary   8/3/2018    Tee Larkin    MRN: [id number]           Patient Information     Date Of Birth          1940        Visit Information        Provider Department      8/3/2018 1:00 PM Mark Lu MD [facility] Dermatology        Today's Diagnoses     SK (seborrheic keratosis)    -  1      Care Instructions    Cryotherapy    What is it?    Use of a very cold liquid, such as liquid nitrogen, to freeze and destroy abnormal skin cells that need to be removed    What should I expect?    Tenderness and redness    A small blister that might grow and fill with dark purple blood. There may be crusting.    More than one treatment may be needed if the lesions do not go away.    How do I care for the treated area?    Gently wash the area with your hands when bathing.    Use a thin layer of Vaseline to help with healing. You may use a Band-Aid.     The area should heal within 7-10 days and may leave behind a pink or lighter color.     Do not use an antibiotic or Neosporin ointment.     You may take acetaminophen (Tylenol) for pain.     Call your Doctor if you have:    Severe pain    Signs of infection (warmth, redness, cloudy yellow drainage, and or a bad smell)    Questions or concerns    Who should I call with questions?       [facility]: [phone]       [facility]: [phone]       For urgent needs outside of business hours call the [facility] at [phone]        and ask for the dermatology resident on call            Follow-ups after your visit        Your next 10 appointments already scheduled     Aug 03, 2018  1:00 PM CDT   (Arrive by 12:45 PM)   Return Visit with Mark Lu MD   [facility] Dermatology ([facility])    [address] [place] [id number] [phone]            Sep 12, 2018  8:15 AM CDT   (Arrive by 8:00 AM)   Return Visit with  Charlene Xiong MD   [facility] Dermatology ([facility])    [address] [facility]  [address] [facility] [address] [phone]              Who to contact     Please call your clinic at [phone] to:    Ask questions about your health    Make or cancel appointments    Discuss your medicines    Learn about your test results    Speak to your doctor            Additional Information About Your Visit        MyChart Information     [email or web address] is an electronic gateway that provides easy, online access to your medical records. With [email or web address], you can request a clinic appointment, read your test results, renew a prescription or communicate with your care team.     To sign up for [email or web address] visit the website at [email or web address]   You will be asked to enter the access code listed below, as well as some personal information. Please follow the directions to create your username and password.     Your access code is: [email or web address]  Expires: 2018 12:57 PM     Your access code will  in 90 days. If you need help or a new code, please contact your [facility] or call [phone] for assistance.        Care EveryWhere ID     This is your Care EveryWhere ID. This could be used by other organizations to access your [place] medical records  [id number]         Blood Pressure from Last 3 Encounters:   17 123/78   16 125/70   16 146/82    Weight from Last 3 Encounters:   16 86.6 kg (191 lb)   16 88.5 kg (195 lb 3.2 oz)   06/29/15 87.8 kg (193 lb 9.6 oz)              Today, you had the following     No orders found for display       Primary Care Provider Office Phone # Fax #    Stephan Weiner [phone]       [facility] [address] [address]        Equal Access to Services     ANN GAY AH: Hadii bert Enriquez, wasusanda bonifacio, qaybta michael conrad  margaret tolbertsania rasheed'aan ah. So [facility] [phone].    ATENCIÓN: Si damon woods, tiene a baker disposición servicios gratuitos de asistencia lingüística. Susan al [phone].    We comply with applicable federal civil rights laws and [place] laws. We do not discriminate on the basis of race, color, national origin, age, disability, sex, sexual orientation, or gender identity.            Thank you!     Thank you for choosing [facility] DERMATOLOGY  for your care. Our goal is always to provide you with excellent care. Hearing back from our patients is one way we can continue to improve our services. Please take a few minutes to complete the written survey that you may receive in the mail after your visit with us. Thank you!             Your Updated Medication List - Protect others around you: Learn how to safely use, store and throw away your medicines at www.disposemymeds.org.          This list is accurate as of 8/3/18 12:58 PM.  Always use your most recent med list.                   Brand Name Dispense Instructions for use Diagnosis    aspirin 81 MG tablet      Take 81 mg by mouth daily.        clobetasol 0.05 % external solution    TEMOVATE    59 mL    Apply several drops over scalp to spread think layer-15 minutes before shower Twice weekly    Folliculitis       FREE & CLEAR Sham      Externally apply  topically. As directed        ketoconazole 2 % shampoo    NIZORAL    120 mL    Use as shampoo daily    Folliculitis       LOSARTAN POTASSIUM PO      Take  by mouth.        metoprolol tartrate 25 MG tablet    LOPRESSOR     Take 25 mg by mouth 2 times daily.        nitroGLYcerin 0.4 MG sublingual tablet    NITROSTAT     Place 0.4 mg under the tongue every 5 minutes as needed.        omeprazole 20 MG tablet      Take 20 mg by mouth daily.        rosuvastatin 40 MG tablet    CRESTOR     Take 40 mg by mouth daily.        sulfamethoxazole-trimethoprim 800-160 MG per tablet    BACTRIM DS/SEPTRA DS    90 tablet     Take 1 tablet by mouth daily    Bacterial folliculitis

## 2018-08-03 NOTE — NURSING NOTE
Dermatology Rooming Note    Tee Larkin's goals for this visit include:   Chief Complaint   Patient presents with     Derm Problem     Tee is visiting discuss area on ear.      Sabrina Mcrae LPN

## 2018-08-03 NOTE — PATIENT INSTRUCTIONS
Cryotherapy    What is it?    Use of a very cold liquid, such as liquid nitrogen, to freeze and destroy abnormal skin cells that need to be removed    What should I expect?    Tenderness and redness    A small blister that might grow and fill with dark purple blood. There may be crusting.    More than one treatment may be needed if the lesions do not go away.    How do I care for the treated area?    Gently wash the area with your hands when bathing.    Use a thin layer of Vaseline to help with healing. You may use a Band-Aid.     The area should heal within 7-10 days and may leave behind a pink or lighter color.     Do not use an antibiotic or Neosporin ointment.     You may take acetaminophen (Tylenol) for pain.     Call your Doctor if you have:    Severe pain    Signs of infection (warmth, redness, cloudy yellow drainage, and or a bad smell)    Questions or concerns    Who should I call with questions?       [facility]: [phone]       [facility]: [phone]       For urgent needs outside of business hours call the [facility] at [phone]        and ask for the dermatology resident on call

## 2018-09-12 ENCOUNTER — OFFICE VISIT (OUTPATIENT)
Dept: DERMATOLOGY | Facility: CLINIC | Age: 78
End: 2018-09-12
Payer: COMMERCIAL

## 2018-09-12 DIAGNOSIS — Z79.899 ENCOUNTER FOR LONG-TERM (CURRENT) USE OF HIGH-RISK MEDICATION: ICD-10-CM

## 2018-09-12 DIAGNOSIS — Z79.899 ENCOUNTER FOR LONG-TERM (CURRENT) USE OF HIGH-RISK MEDICATION: Primary | ICD-10-CM

## 2018-09-12 LAB
ANION GAP SERPL CALCULATED.3IONS-SCNC: 4 MMOL/L (ref 3–14)
BUN SERPL-MCNC: 15 MG/DL (ref 7–30)
CALCIUM SERPL-MCNC: 9 MG/DL (ref 8.5–10.1)
CHLORIDE SERPL-SCNC: 108 MMOL/L (ref 94–109)
CO2 SERPL-SCNC: 28 MMOL/L (ref 20–32)
CREAT SERPL-MCNC: 1.07 MG/DL (ref 0.66–1.25)
ERYTHROCYTE [DISTWIDTH] IN BLOOD BY AUTOMATED COUNT: 11.9 % (ref 10–15)
GFR SERPL CREATININE-BSD FRML MDRD: 67 ML/MIN/1.7M2
GLUCOSE SERPL-MCNC: 103 MG/DL (ref 70–99)
HCT VFR BLD AUTO: 46.4 % (ref 40–53)
HGB BLD-MCNC: 15.4 G/DL (ref 13.3–17.7)
MCH RBC QN AUTO: 33 PG (ref 26.5–33)
MCHC RBC AUTO-ENTMCNC: 33.2 G/DL (ref 31.5–36.5)
MCV RBC AUTO: 99 FL (ref 78–100)
PLATELET # BLD AUTO: 192 10E9/L (ref 150–450)
POTASSIUM SERPL-SCNC: 4.4 MMOL/L (ref 3.4–5.3)
RBC # BLD AUTO: 4.67 10E12/L (ref 4.4–5.9)
SODIUM SERPL-SCNC: 140 MMOL/L (ref 133–144)
WBC # BLD AUTO: 6.2 10E9/L (ref 4–11)

## 2018-09-12 ASSESSMENT — PAIN SCALES - GENERAL: PAINLEVEL: NO PAIN (0)

## 2018-09-12 NOTE — MR AVS SNAPSHOT
After Visit Summary   9/12/2018    Tee Larkin    MRN: [id number]           Patient Information     Date Of Birth          1940        Visit Information        Provider Department      9/12/2018 8:15 AM Charlene Xiong MD [facility] Dermatology        Today's Diagnoses     Encounter for long-term (current) use of high-risk medication    -  1      Care Instructions    For today, we will check some basic labs including blood counts and tests for kidney function. You can do this right after your appointment downstairs.   For the skin you should continue to take the bactrim once daily and use the ketoconazole shampoo once per week. We will follow-up in 6 months.          Follow-ups after your visit        Follow-up notes from your care team     Return in about 6 months (around 3/12/2019).      Your next 10 appointments already scheduled     Sep 12, 2018  8:30 AM CDT   LAB with [facility] ([facility])    [address] [place] [id number] [phone]           Please do not eat 10-12 hours before your appointment if you are coming in fasting for labs on lipids, cholesterol, or glucose (sugar). This does not apply to pregnant women. Water, hot tea and black coffee (with nothing added) are okay. Do not drink other fluids, diet soda or chew gum.              Future tests that were ordered for you today     Open Future Orders        Priority Expected Expires Ordered    CBC with platelets Routine  9/12/2019 9/12/2018    Basic metabolic panel Routine  9/12/2019 9/12/2018            Who to contact     Please call your clinic at [phone] to:    Ask questions about your health    Make or cancel appointments    Discuss your medicines    Learn about your test results    Speak to your doctor            Additional Information About Your Visit        Toplisthart Information     [employer] is an electronic gateway that provides easy, online access to  your medical records. With [employer], you can request a clinic appointment, read your test results, renew a prescription or communicate with your care team.     To sign up for [employer] visit the website at [email or web address]   You will be asked to enter the access code listed below, as well as some personal information. Please follow the directions to create your username and password.     Your access code is: [email or web address]  Expires: 2018 12:57 PM     Your access code will  in 90 days. If you need help or a new code, please contact your [facility] or call [phone] for assistance.        Care EveryWhere ID     This is your Care EveryWhere ID. This could be used by other organizations to access your [place] medical records  [id number]         Blood Pressure from Last 3 Encounters:   17 123/78   16 125/70   16 146/82    Weight from Last 3 Encounters:   16 86.6 kg (191 lb)   16 88.5 kg (195 lb 3.2 oz)   06/29/15 87.8 kg (193 lb 9.6 oz)               Primary Care Provider Office Phone # Fax #    Stephan Weiner [phone]       [facility] [address] [address]        Equal Access to Services     [facility] AH: Hadii aad ku hadasho Soomaali, waaxda luqadaha, qaybta kaalmada adeegyada, michael robles haybrenda sanchez . So [facility] [phone].    ATENCIÓN: Si habla español, tiene a baker disposición servicios gratuitos de asistencia lingüística. Llame al [phone].    We comply with applicable federal civil rights laws and [place] laws. We do not discriminate on the basis of race, color, national origin, age, disability, sex, sexual orientation, or gender identity.            Thank you!     Thank you for choosing [facility]  for your care. Our goal is always to provide you with excellent care. Hearing back from our patients is one way we can continue to improve our  services. Please take a few minutes to complete the written survey that you may receive in the mail after your visit with us. Thank you!             Your Updated Medication List - Protect others around you: Learn how to safely use, store and throw away your medicines at www.disposemymeds.org.          This list is accurate as of 9/12/18  8:21 AM.  Always use your most recent med list.                   Brand Name Dispense Instructions for use Diagnosis    aspirin 81 MG tablet      Take 81 mg by mouth daily.        clobetasol 0.05 % external solution    TEMOVATE    59 mL    Apply several drops over scalp to spread think layer-15 minutes before shower Twice weekly    Folliculitis       FREE & CLEAR Sham      Externally apply  topically. As directed        ketoconazole 2 % shampoo    NIZORAL    120 mL    Use as shampoo daily    Folliculitis       LOSARTAN POTASSIUM PO      Take  by mouth.        metoprolol tartrate 25 MG tablet    LOPRESSOR     Take 25 mg by mouth 2 times daily.        nitroGLYcerin 0.4 MG sublingual tablet    NITROSTAT     Place 0.4 mg under the tongue every 5 minutes as needed.        omeprazole 20 MG tablet      Take 20 mg by mouth daily.        rosuvastatin 40 MG tablet    CRESTOR     Take 40 mg by mouth daily.        sulfamethoxazole-trimethoprim 800-160 MG per tablet    BACTRIM DS/SEPTRA DS    90 tablet    Take 1 tablet by mouth daily    Bacterial folliculitis

## 2018-09-12 NOTE — PROGRESS NOTES
[facility] Dermatology Note      Dermatology Problem List:  1. Pityrosporum Folliculitis with rosacea component, involvement of scalp and face.                        - Pityrosporum biopsy proven many years ago. Bactrim DS 1 pill every   day.                         - Last CBC and CMP, 04/18/17, will repeat today 9/12/18                        - Ketoconazole 2% shampoo once weekly.   2.  Seborrheic dermatitis                        - 2% ketoconazole shampoo.  3. Inflamed seborrheic keratosis             - Cryotherapy to lesion on left ear on 8/3/18       Encounter Date: Sep 12, 2018    CC:  Chief Complaint   Patient presents with     Derm Problem     Tee is here for a folliculitis follow up, states he hasn't had any recent flare ups.          History of Present Illness:  Mr. Tee Larkin is a 78 year old male who presents as a follow-up for folliculitis. The patient was last seen by Dr. Lu on 8/3/2018 when he had cryotherapy for an inflamed seborrheic keratosis on the ear. When he was seen last for his folliculitis (10/31/2017) there was discussion about decreasing his bactrim dose to every other day. However, he is currently using the Bactrim every day. He did try to go to every other day but right away noticed a flare. He continues to use the ketoconazole shampoo once per week.     He thinks his skin is overall okay, but wonders if he is having a reaction to something (likely something he eats) that causes flares of the pustules on the scalp. When he has a flare, the scalp is itchy and the pustules bleed. This is consistent with his long history in this clinic of folliculitis. He is not currently in a flare, last flare was 4-5 months ago. He reports that the rosacea and sebhorreic dermatitis is under good control. He has no other new concerns today.       Past Medical History:   Patient Active Problem List   Diagnosis     Other specified disease of hair and hair follicles     No past  medical history on file.  No past surgical history on file.    Social History:   reports that he has quit smoking. His smoking use included Cigarettes. He has never used smokeless tobacco.    Family History:  Family History   Problem Relation Age of Onset     Cancer No family hx of      no skin cancer     Skin Cancer No family hx of      Melanoma No family hx of        Medications:  Current Outpatient Prescriptions   Medication Sig Dispense Refill     aspirin 81 MG tablet Take 81 mg by mouth daily.       clobetasol (TEMOVATE) 0.05 % external solution Apply several drops over scalp to spread think layer-15 minutes before shower Twice weekly 59 mL 6     ketoconazole (NIZORAL) 2 % shampoo Use as shampoo daily 120 mL 11     LOSARTAN POTASSIUM PO Take  by mouth.       metoprolol (LOPRESSOR) 25 MG tablet Take 25 mg by mouth 2 times daily.       nitroGLYCERIN (NITROSTAT) 0.4 MG SL tablet Place 0.4 mg under the tongue every 5 minutes as needed.       Omeprazole 20 MG tablet Take 20 mg by mouth daily.       rosuvastatin (CRESTOR) 40 MG tablet Take 40 mg by mouth daily.       Shampoos (FREE & CLEAR) SHAM Externally apply  topically. As directed       sulfamethoxazole-trimethoprim (BACTRIM DS/SEPTRA DS) 800-160 MG per tablet Take 1 tablet by mouth daily 90 tablet 1     Allergies   Allergen Reactions     Penicillins Hives     Chocolate Rash     Nuts Rash         Review of Systems:  -As per HPI  -Constitutional: The patient denies fatigue, fevers, chills, unintended weight loss, and night sweats.  -HEENT: Patient denies nonhealing oral sores.  -Skin: As above in HPI. No additional skin concerns.       Physical exam:  Vitals: There were no vitals taken for this visit.  GEN: This is a well developed, well-nourished male in no acute distress, in a pleasant mood.    SKIN: Focused examination of the scalp and face was performed.  -Minimal scaling on the scalp, interval improvement in erythema of the scalp   -No pustules or other  evidence of folliculitis  -Telangiectasias on the nose consistent with rosacea  -No other lesions of concern on areas examined.     Impression/Plan:  1. Pityrosporum folliculitis: though Tee continues to have flares and has been unsuccessful in decreasing the dosing of bactrim, there has been steady improvement over his years in this clinic. Overall his scalp is less erythematous than in previous years and he is on a lower dose of bactrim than when treatment was initiated.    Continue with Bactrim DS once daily     Repeat CBC and CMP today    Continue ketoconazole 2% shampoo    2. Seborrheic dermatitis: improved.     Continue ketoconazole 2% shampoo   .  Follow-up in 6 months, earlier for new or changing lesions.     Staff Involved:  I, Vaishnavi Wang, saw and examined the patient in the presence of Dr. Xiong.       .I was present with the medical student who participated in the service and in the documentation of the note. I have verified the history and personally performed the physical exam and medical decision making. I agree with the assessment and plan of care as documented in the note.  Charlene Xiong MD

## 2018-09-12 NOTE — PATIENT INSTRUCTIONS
For today, we will check some basic labs including blood counts and tests for kidney function. You can do this right after your appointment downstairs.   For the skin you should continue to take the bactrim once daily and use the ketoconazole shampoo once per week. We will follow-up in 6 months.

## 2018-09-12 NOTE — LETTER
9/12/2018       RE: Tee Larkin  [address]  [address]  [place] [address]     Dear Colleague,    Thank you for referring your patient, Tee Larkin, to the [facility] DERMATOLOGY at [facility]. Please see a copy of my visit note below.    [facility] Dermatology Note      Dermatology Problem List:  1. Pityrosporum Folliculitis with rosacea component, involvement of scalp and face.                        - Pityrosporum biopsy proven many years ago. Bactrim DS 1 pill every   day.                         - Last CBC and CMP, 04/18/17, will repeat today 9/12/18                        - Ketoconazole 2% shampoo once weekly.   2.  Seborrheic dermatitis                        - 2% ketoconazole shampoo.  3. Inflamed seborrheic keratosis             - Cryotherapy to lesion on left ear on 8/3/18       Encounter Date: Sep 12, 2018    CC:  Chief Complaint   Patient presents with     Derm Problem     Tee is here for a folliculitis follow up, states he hasn't had any recent flare ups.          History of Present Illness:  Mr. Tee Larkin is a 78 year old male who presents as a follow-up for folliculitis. The patient was last seen by Dr. Lu on 8/3/2018 when he had cryotherapy for an inflamed seborrheic keratosis on the ear. When he was seen last for his folliculitis (10/31/2017) there was discussion about decreasing his bactrim dose to every other day. However, he is currently using the Bactrim every day. He did try to go to every other day but right away noticed a flare. He continues to use the ketoconazole shampoo once per week.     He thinks his skin is overall okay, but wonders if he is having a reaction to something (likely something he eats) that causes flares of the pustules on the scalp. When he has a flare, the scalp is itchy and the pustules bleed. This is consistent with his long history in this clinic of folliculitis. He is not currently in a  flare, last flare was 4-5 months ago. He reports that the rosacea and sebhorreic dermatitis is under good control. He has no other new concerns today.       Past Medical History:   Patient Active Problem List   Diagnosis     Other specified disease of hair and hair follicles     No past medical history on file.  No past surgical history on file.    Social History:   reports that he has quit smoking. His smoking use included Cigarettes. He has never used smokeless tobacco.    Family History:  Family History   Problem Relation Age of Onset     Cancer No family hx of      no skin cancer     Skin Cancer No family hx of      Melanoma No family hx of        Medications:  Current Outpatient Prescriptions   Medication Sig Dispense Refill     aspirin 81 MG tablet Take 81 mg by mouth daily.       clobetasol (TEMOVATE) 0.05 % external solution Apply several drops over scalp to spread think layer-15 minutes before shower Twice weekly 59 mL 6     ketoconazole (NIZORAL) 2 % shampoo Use as shampoo daily 120 mL 11     LOSARTAN POTASSIUM PO Take  by mouth.       metoprolol (LOPRESSOR) 25 MG tablet Take 25 mg by mouth 2 times daily.       nitroGLYCERIN (NITROSTAT) 0.4 MG SL tablet Place 0.4 mg under the tongue every 5 minutes as needed.       Omeprazole 20 MG tablet Take 20 mg by mouth daily.       rosuvastatin (CRESTOR) 40 MG tablet Take 40 mg by mouth daily.       Shampoos (FREE & CLEAR) SHAM Externally apply  topically. As directed       sulfamethoxazole-trimethoprim (BACTRIM DS/SEPTRA DS) 800-160 MG per tablet Take 1 tablet by mouth daily 90 tablet 1     Allergies   Allergen Reactions     Penicillins Hives     Chocolate Rash     Nuts Rash         Review of Systems:  -As per HPI  -Constitutional: The patient denies fatigue, fevers, chills, unintended weight loss, and night sweats.  -HEENT: Patient denies nonhealing oral sores.  -Skin: As above in HPI. No additional skin concerns.       Physical exam:  Vitals: There were no vitals  taken for this visit.  GEN: This is a well developed, well-nourished male in no acute distress, in a pleasant mood.    SKIN: Focused examination of the scalp and face was performed.  -Minimal scaling on the scalp, interval improvement in erythema of the scalp   -No pustules or other evidence of folliculitis  -Telangiectasias on the nose consistent with rosacea  -No other lesions of concern on areas examined.     Impression/Plan:  1. Pityrosporum folliculitis: though Tee continues to have flares and has been unsuccessful in decreasing the dosing of bactrim, there has been steady improvement over his years in this clinic. Overall his scalp is less erythematous than in previous years and he is on a lower dose of bactrim than when treatment was initiated.    Continue with Bactrim DS once daily     Repeat CBC and CMP today    Continue ketoconazole 2% shampoo    2. Seborrheic dermatitis: improved.     Continue ketoconazole 2% shampoo   .  Follow-up in 6 months, earlier for new or changing lesions.     Staff Involved:  I, Vaishnavi Wang, saw and examined the patient in the presence of Dr. Xiong.       .I was present with the medical student who participated in the service and in the documentation of the note. I have verified the history and personally performed the physical exam and medical decision making. I agree with the assessment and plan of care as documented in the note.  Charlene Xiong MD

## 2018-09-12 NOTE — NURSING NOTE
Dermatology Rooming Note    Tee Larkin's goals for this visit include:   Chief Complaint   Patient presents with     Derm Problem     Tee is here for a folliculitis follow up, states he hasn't had any recent flare ups.        Rosetta Jensen LPN

## 2019-01-14 ENCOUNTER — TELEPHONE (OUTPATIENT)
Dept: DERMATOLOGY | Facility: CLINIC | Age: 79
End: 2019-01-14

## 2019-01-14 DIAGNOSIS — L73.9 FOLLICULITIS: Primary | ICD-10-CM

## 2019-01-14 RX ORDER — SULFAMETHOXAZOLE/TRIMETHOPRIM 800-160 MG
160 TABLET ORAL DAILY
COMMUNITY
Start: 2018-03-05 | End: 2019-01-14

## 2019-01-14 RX ORDER — SULFAMETHOXAZOLE/TRIMETHOPRIM 800-160 MG
1 TABLET ORAL DAILY
Qty: 90 TABLET | Refills: 2 | Status: SHIPPED | OUTPATIENT
Start: 2019-01-14 | End: 2019-04-17

## 2019-01-14 NOTE — TELEPHONE ENCOUNTER
Received refill request for Bactrim DS daily as the resident on call. Reviewed patient's chart and attached communication. Patient last seen 9/12/18 for pityrosporum folliculitis. RTC April 2019. After reviewing the medication list and assessment and plan from last visit, the refill request was accepted.    The patient's information will be forwarded to the scheduling pool for return visit as planned at prior visit.    Routed as KARTIK.    Angie Cole M.D.  Dermatology, PGY-3  [facility]  Pager [phone]

## 2019-01-14 NOTE — TELEPHONE ENCOUNTER
Impression/Plan:  1. Pityrosporum folliculitis: though Tee continues to have flares and has been unsuccessful in decreasing the dosing of bactrim, there has been steady improvement over his years in this clinic. Overall his scalp is less erythematous than in previous years and he is on a lower dose of bactrim than when treatment was initiated.    Continue with Bactrim DS once daily     Repeat CBC and CMP today    Continue ketoconazole 2% shampoo     2. Seborrheic dermatitis: improved.     Continue ketoconazole 2% shampoo   .  Follow-up in 6 months, earlier for new or changing lesions.   Loren Story, CMA

## 2019-01-14 NOTE — TELEPHONE ENCOUNTER
[facility]    Phone Message    May a detailed message be left on voicemail: yes    Reason for Call: Other: The pt is calling about sulfamethoxazole-trimethoprim (BACTRIM DS/SEPTRA DS) 800-160 MG per tablet. He states his pharmacy has sent a couple requests for this med. He will be out tomorrow. Please look into this and call the pt. Thanks.       Action Taken: Message routed to:  [facility] ([facility] kenrick de la rosa

## 2019-03-21 ENCOUNTER — DOCUMENTATION ONLY (OUTPATIENT)
Dept: CARE COORDINATION | Facility: CLINIC | Age: 79
End: 2019-03-21

## 2019-04-17 ENCOUNTER — OFFICE VISIT (OUTPATIENT)
Dept: DERMATOLOGY | Facility: CLINIC | Age: 79
End: 2019-04-17
Payer: COMMERCIAL

## 2019-04-17 DIAGNOSIS — L73.9 FOLLICULITIS: ICD-10-CM

## 2019-04-17 RX ORDER — SULFAMETHOXAZOLE/TRIMETHOPRIM 800-160 MG
1 TABLET ORAL DAILY
Qty: 90 TABLET | Refills: 2 | Status: SHIPPED | OUTPATIENT
Start: 2019-04-17 | End: 2019-08-12

## 2019-04-17 ASSESSMENT — PAIN SCALES - GENERAL: PAINLEVEL: NO PAIN (0)

## 2019-04-17 NOTE — PROGRESS NOTES
[facility] Dermatology Note      Dermatology Problem List:    1. Pityrosporum Folliculitis with rosacea component, involvement of scalp and face.                        - Pityrosporum biopsy proven many years ago. Bactrim DS 1 pill every day.                         - Last CBC and BMP 11/30/2018 were normal                        - Ketoconazole 2% shampoo once weekly.   2.  Seborrheic dermatitis                        - 2% ketoconazole shampoo.  3. Inflamed seborrheic keratosis                        - Cryotherapy to lesion on left ear on 8/3/18      CC:   Chief Complaint   Patient presents with     Derm Problem     Tee is here for a follow up for his scalp, states it's doing good.          Encounter Date: Apr 17, 2019    History of Present Illness:  Mr. Tee Larkin is a 79 year old male who returns for follow up of Pityrosporum folliculitis and seb derm.  He has been taking his bactrim DS one daily and using his ketoconazole shampoo once weekly.  He is doing really well with no pustular outbreaks since our last visit.  He would like to try to wean off the pills again if possible.  Previous attempts to wean have resulted in flares.     Past Medical History:   Patient Active Problem List   Diagnosis     Other specified disease of hair and hair follicles     No past medical history on file.  No past surgical history on file.    Social History:  Patient reports that he has quit smoking. His smoking use included cigarettes. He has never used smokeless tobacco.    Family History:  Family History   Problem Relation Age of Onset     Cancer No family hx of         no skin cancer     Skin Cancer No family hx of      Melanoma No family hx of        Medications:  Current Outpatient Medications   Medication Sig Dispense Refill     aspirin 81 MG tablet Take 81 mg by mouth daily.       ketoconazole (NIZORAL) 2 % shampoo Use as shampoo daily (Patient taking differently: once a week Use as shampoo daily) 120  mL 11     LOSARTAN POTASSIUM PO Take  by mouth.       metoprolol (LOPRESSOR) 25 MG tablet Take 25 mg by mouth 2 times daily.       nitroGLYCERIN (NITROSTAT) 0.4 MG SL tablet Place 0.4 mg under the tongue every 5 minutes as needed.       Omeprazole 20 MG tablet Take 20 mg by mouth daily.       rosuvastatin (CRESTOR) 40 MG tablet Take 40 mg by mouth daily.       Shampoos (FREE & CLEAR) SHAM Externally apply  topically. As directed       sulfamethoxazole-trimethoprim (BACTRIM DS/SEPTRA DS) 800-160 MG tablet Take 1 tablet by mouth daily 1 tab po QD 90 tablet 2     clobetasol (TEMOVATE) 0.05 % external solution Apply several drops over scalp to spread think layer-15 minutes before shower Twice weekly (Patient not taking: Reported on 4/17/2019) 59 mL 6     Allergies   Allergen Reactions     Penicillins Hives     Chocolate Rash     Nuts Rash           Physical exam:  Vitals: There were no vitals taken for this visit.  GEN: This is a well developed, well-nourished male in no acute distress, in a pleasant mood.    SKIN: Focused examination of the face and scalp was performed.  -No pustules, scale or erythema  -No other lesions of concern on areas examined.     Impression/Plan:  1. Pityrosporum folliculitis- doing very well.  Labs in November were normal.    I agree with the patient that we should try to get off the bactrim again.  Plan for taper:    For the Bactrim pills (sulfamethoxazole-trimethoprim)  Take one pill every other day for 2 weeks, and if OK  Then take one pill three days a week for 2 weeks (example Mon, Wed, Fri)  Then if ok, take one pill 2 days a week for 2 weeks (example Tues, Thurs)  Then if ok, stop the pills  Plan to continue the ketoconazole shampoo once weekly as maintenance    CC Referred Self, MD  No address on file on close of this encounter.  Follow-up in 4 months, earlier for new or changing lesions.       Staff Involved:  Staff Only

## 2019-04-17 NOTE — PATIENT INSTRUCTIONS
For the Bactrim pills (sulfamethoxazole-trimethoprim)  Take one pill every other day for 2 weeks, and if OK  Then take one pill three days a week for 2 weeks (example Mon, Wed, Fri)  Then if ok, take one pill 2 days a week for 2 weeks (example Tues, Thurs)  Then if ok, stop the pills

## 2019-04-17 NOTE — LETTER
4/17/2019       RE: Tee Larkin  [address]  [address]  [facility] [id number]     Dear Colleague,    Thank you for referring your patient, Tee Larkin, to the [facility] DERMATOLOGY at [facility]. Please see a copy of my visit note below.    [facility] Dermatology Note      Dermatology Problem List:    1. Pityrosporum Folliculitis with rosacea component, involvement of scalp and face.                        - Pityrosporum biopsy proven many years ago. Bactrim DS 1 pill every day.                         - Last CBC and BMP 11/30/2018 were normal                        - Ketoconazole 2% shampoo once weekly.   2.  Seborrheic dermatitis                        - 2% ketoconazole shampoo.  3. Inflamed seborrheic keratosis                        - Cryotherapy to lesion on left ear on 8/3/18      CC:   Chief Complaint   Patient presents with     Derm Problem     Tee is here for a follow up for his scalp, states it's doing good.          Encounter Date: Apr 17, 2019    History of Present Illness:  Mr. Tee Larkin is a 79 year old male who returns for follow up of Pityrosporum folliculitis and seb derm.  He has been taking his bactrim DS one daily and using his ketoconazole shampoo once weekly.  He is doing really well with no pustular outbreaks since our last visit.  He would like to try to wean off the pills again if possible.  Previous attempts to wean have resulted in flares.     Past Medical History:   Patient Active Problem List   Diagnosis     Other specified disease of hair and hair follicles     No past medical history on file.  No past surgical history on file.    Social History:  Patient reports that he has quit smoking. His smoking use included cigarettes. He has never used smokeless tobacco.    Family History:  Family History   Problem Relation Age of Onset     Cancer No family hx of         no skin cancer     Skin Cancer No family hx of       Melanoma No family hx of        Medications:  Current Outpatient Medications   Medication Sig Dispense Refill     aspirin 81 MG tablet Take 81 mg by mouth daily.       ketoconazole (NIZORAL) 2 % shampoo Use as shampoo daily (Patient taking differently: once a week Use as shampoo daily) 120 mL 11     LOSARTAN POTASSIUM PO Take  by mouth.       metoprolol (LOPRESSOR) 25 MG tablet Take 25 mg by mouth 2 times daily.       nitroGLYCERIN (NITROSTAT) 0.4 MG SL tablet Place 0.4 mg under the tongue every 5 minutes as needed.       Omeprazole 20 MG tablet Take 20 mg by mouth daily.       rosuvastatin (CRESTOR) 40 MG tablet Take 40 mg by mouth daily.       Shampoos (FREE & CLEAR) SHAM Externally apply  topically. As directed       sulfamethoxazole-trimethoprim (BACTRIM DS/SEPTRA DS) 800-160 MG tablet Take 1 tablet by mouth daily 1 tab po QD 90 tablet 2     clobetasol (TEMOVATE) 0.05 % external solution Apply several drops over scalp to spread think layer-15 minutes before shower Twice weekly (Patient not taking: Reported on 4/17/2019) 59 mL 6     Allergies   Allergen Reactions     Penicillins Hives     Chocolate Rash     Nuts Rash           Physical exam:  Vitals: There were no vitals taken for this visit.  GEN: This is a well developed, well-nourished male in no acute distress, in a pleasant mood.    SKIN: Focused examination of the face and scalp was performed.  -No pustules, scale or erythema  -No other lesions of concern on areas examined.     Impression/Plan:  1. Pityrosporum folliculitis- doing very well.  Labs in November were normal.    I agree with the patient that we should try to get off the bactrim again.  Plan for taper:    For the Bactrim pills (sulfamethoxazole-trimethoprim)  Take one pill every other day for 2 weeks, and if OK  Then take one pill three days a week for 2 weeks (example Mon, Wed, Fri)  Then if ok, take one pill 2 days a week for 2 weeks (example Tues, Thurs)  Then if ok, stop the pills  Plan to  continue the ketoconazole shampoo once weekly as maintenance    CC Referred Self, MD  No address on file on close of this encounter.  Follow-up in 4 months, earlier for new or changing lesions.       Staff Involved:  Staff Only    Again, thank you for allowing me to participate in the care of your patient.      Sincerely,    Charlene Xiong MD

## 2019-04-17 NOTE — NURSING NOTE
Dermatology Rooming Note    Tee Larkin's goals for this visit include:   Chief Complaint   Patient presents with     Derm Problem     Tee is here for a follow up for his scalp, states it's doing good.        Rosetta Jensen LPN

## 2019-08-12 ENCOUNTER — OFFICE VISIT (OUTPATIENT)
Dept: DERMATOLOGY | Facility: CLINIC | Age: 79
End: 2019-08-12
Payer: COMMERCIAL

## 2019-08-12 DIAGNOSIS — L71.9 ROSACEA: ICD-10-CM

## 2019-08-12 DIAGNOSIS — L73.9 FOLLICULITIS: Primary | ICD-10-CM

## 2019-08-12 RX ORDER — CLINDAMYCIN PHOSPHATE 10 UG/ML
LOTION TOPICAL DAILY
Qty: 60 ML | Refills: 3 | Status: SHIPPED | OUTPATIENT
Start: 2019-08-12 | End: 2019-11-13

## 2019-08-12 RX ORDER — SULFAMETHOXAZOLE/TRIMETHOPRIM 800-160 MG
1 TABLET ORAL EVERY OTHER DAY
Qty: 45 TABLET | Refills: 0 | Status: SHIPPED | OUTPATIENT
Start: 2019-08-12 | End: 2019-08-12

## 2019-08-12 RX ORDER — SULFAMETHOXAZOLE/TRIMETHOPRIM 800-160 MG
1 TABLET ORAL EVERY OTHER DAY
Qty: 45 TABLET | Refills: 0 | Status: SHIPPED | OUTPATIENT
Start: 2019-08-12 | End: 2020-08-21

## 2019-08-12 ASSESSMENT — PAIN SCALES - GENERAL: PAINLEVEL: NO PAIN (0)

## 2019-08-12 NOTE — NURSING NOTE
Dermatology Rooming Note    Tee Larkin's goals for this visit include:   Chief Complaint   Patient presents with     Derm Problem     Tee is here for a follow up for his scalp, states he hasn't had any recent outbreaks.        Rosetta Jensen LPN

## 2019-08-12 NOTE — LETTER
8/12/2019       RE: Tee Larkin  [address]  [address]  [facility] [address]     Dear Colleague,    Thank you for referring your patient, Tee Larkin, to the [facility] DERMATOLOGY at [facility]. Please see a copy of my visit note below.    [facility] Dermatology Note    Dermatology Problem List:  1. Pityrosporum Folliculitis with rosacea component, involvement of scalp and face.  - Pityrosporum biopsy proven many years ago. Last CBC and BMP 11/30/2018 were normal.   - Current Tx: bactrim DS every other day, clindamycin lotion, BPO wash  2.  Seborrheic dermatitis  - Current Tx: 2% ketoconazole shampoo.  3. Inflamed seborrheic keratosis  - Cryotherapy to lesion on left ear on 8/3/18    Encounter Date: Aug 12, 2019    CC:   Folliculitis 4 month f/u    History of Present Illness:  Mr. Tee Larkin is a 79 year old male with past dermatologic history listed above who presents as a follow-up for pityrosporum folliculitis. The patient was last seen 4/17/19 when he was tapered down to Bactrim DS once daily. Reports no problems with this transition but is unsure if he should continue the medication. Denies any new spots on his scalp that he can feel. Uses ketoconazole shampoo once weekly    Of note, has red papule on left medial malar cheek that's been present for about 3 weeks. Was initially tender but that has resolved. Denies any bleeding.     Past Medical History:   Patient Active Problem List   Diagnosis     Other specified disease of hair and hair follicles     No past medical history on file.  No past surgical history on file.    Social History:  Patient  reports that he has quit smoking. His smoking use included cigarettes. He has never used smokeless tobacco.    Family History:  Family History   Problem Relation Age of Onset     Cancer No family hx of         no skin cancer     Skin Cancer No family hx of      Melanoma No family hx of         Medications:  Current Outpatient Medications   Medication Sig Dispense Refill     aspirin 81 MG tablet Take 81 mg by mouth daily.       benzoyl peroxide 5 % external liquid Use daily in the shower on your upper back, neck, chest and posterior scalp 226 g 1     clindamycin (CLEOCIN T) 1 % external lotion Apply topically daily To be applied after the shower on new spots 60 mL 3     clobetasol (TEMOVATE) 0.05 % external solution Apply several drops over scalp to spread think layer-15 minutes before shower Twice weekly 59 mL 6     ketoconazole (NIZORAL) 2 % shampoo Use as shampoo daily (Patient taking differently: once a week Use as shampoo daily) 120 mL 11     LOSARTAN POTASSIUM PO Take  by mouth.       metoprolol (LOPRESSOR) 25 MG tablet Take 25 mg by mouth 2 times daily.       nitroGLYCERIN (NITROSTAT) 0.4 MG SL tablet Place 0.4 mg under the tongue every 5 minutes as needed.       Omeprazole 20 MG tablet Take 20 mg by mouth daily.       rosuvastatin (CRESTOR) 40 MG tablet Take 40 mg by mouth daily.       Shampoos (FREE & CLEAR) SHAM Externally apply  topically. As directed       sulfamethoxazole-trimethoprim (BACTRIM DS/SEPTRA DS) 800-160 MG tablet Take 1 tablet by mouth every other day 1 tab po QD 45 tablet 0        Allergies   Allergen Reactions     Penicillins Hives     Chocolate Rash     Nuts Rash       Review of Systems:  - As per HPI  - Constitutional: Otherwise feeling well today, in usual state of health.  - Skin: As above in HPI. No additional skin concerns.    Physical exam:  Vitals: There were no vitals taken for this visit.  GEN: This is a well developed, well-nourished male in no acute distress, in a pleasant mood.    SKIN: Acne exam, which includes the face, neck, upper central chest, and upper central back was performed.  - [place] Skin Type 2  - Scattered perifollicular pustules on the crown of scalp, occiput, and upper neck/back/chest  - Red papule on the medial left malar cheek with  central keratotic plug. No telangiectasias on dermoscopy  - No other lesions of concern on areas examined.     Impression/Plan:  1. Pityrosporum folliculitis     Condition is well controlled today. Has some new pustules on upper back and posterior neck. Will continue with Bactrim taper and initiation of topicals to help manage condition    Prescribed Bactrim DS every other day    Prescribed Clindamycin lotion to be used as spot treatment after shower    Recommended Benzoyl peroxide wash to be used daily in the shower on upper back, neck, chest, and face    Continue with ketoconazole shampoo once a week    RTC in 3 months    2. Papule on the left medial malar cheek    Based off H&P, most likely represents early rosacea papule/pustule. Will continue to monitor and reassess at next office visit    Follow-up in 3 months, earlier for new or changing lesions.     Staff Involved:  Patient was seen and staffed with attending physician Dr. Tyrese Marroquin MD  Med/Derm Resident PGY-2  P:[phone]    Staff Addendum:  I, Charlene Xiong MD, saw this patient with the resident and agree with the resident s findings and plan of care as documented in the resident s note.      Again, thank you for allowing me to participate in the care of your patient.      Sincerely,    Charlene Xiong MD

## 2019-08-12 NOTE — PROGRESS NOTES
[facility] Dermatology Note    Dermatology Problem List:  1. Pityrosporum Folliculitis with rosacea component, involvement of scalp and face.  - Pityrosporum biopsy proven many years ago. Last CBC and BMP 11/30/2018 were normal.   - Current Tx: bactrim DS every other day, clindamycin lotion, BPO wash  2.  Seborrheic dermatitis  - Current Tx: 2% ketoconazole shampoo.  3. Inflamed seborrheic keratosis  - Cryotherapy to lesion on left ear on 8/3/18    Encounter Date: Aug 12, 2019    CC:   Folliculitis 4 month f/u    History of Present Illness:  Mr. Tee Larkin is a 79 year old male with past dermatologic history listed above who presents as a follow-up for pityrosporum folliculitis. The patient was last seen 4/17/19 when he was tapered down to Bactrim DS once daily. Reports no problems with this transition but is unsure if he should continue the medication. Denies any new spots on his scalp that he can feel. Uses ketoconazole shampoo once weekly    Of note, has red papule on left medial malar cheek that's been present for about 3 weeks. Was initially tender but that has resolved. Denies any bleeding.     Past Medical History:   Patient Active Problem List   Diagnosis     Other specified disease of hair and hair follicles     No past medical history on file.  No past surgical history on file.    Social History:  Patient  reports that he has quit smoking. His smoking use included cigarettes. He has never used smokeless tobacco.    Family History:  Family History   Problem Relation Age of Onset     Cancer No family hx of         no skin cancer     Skin Cancer No family hx of      Melanoma No family hx of        Medications:  Current Outpatient Medications   Medication Sig Dispense Refill     aspirin 81 MG tablet Take 81 mg by mouth daily.       benzoyl peroxide 5 % external liquid Use daily in the shower on your upper back, neck, chest and posterior scalp 226 g 1     clindamycin (CLEOCIN T) 1 %  external lotion Apply topically daily To be applied after the shower on new spots 60 mL 3     clobetasol (TEMOVATE) 0.05 % external solution Apply several drops over scalp to spread think layer-15 minutes before shower Twice weekly 59 mL 6     ketoconazole (NIZORAL) 2 % shampoo Use as shampoo daily (Patient taking differently: once a week Use as shampoo daily) 120 mL 11     LOSARTAN POTASSIUM PO Take  by mouth.       metoprolol (LOPRESSOR) 25 MG tablet Take 25 mg by mouth 2 times daily.       nitroGLYCERIN (NITROSTAT) 0.4 MG SL tablet Place 0.4 mg under the tongue every 5 minutes as needed.       Omeprazole 20 MG tablet Take 20 mg by mouth daily.       rosuvastatin (CRESTOR) 40 MG tablet Take 40 mg by mouth daily.       Shampoos (FREE & CLEAR) SHAM Externally apply  topically. As directed       sulfamethoxazole-trimethoprim (BACTRIM DS/SEPTRA DS) 800-160 MG tablet Take 1 tablet by mouth every other day 1 tab po QD 45 tablet 0        Allergies   Allergen Reactions     Penicillins Hives     Chocolate Rash     Nuts Rash       Review of Systems:  - As per HPI  - Constitutional: Otherwise feeling well today, in usual state of health.  - Skin: As above in HPI. No additional skin concerns.    Physical exam:  Vitals: There were no vitals taken for this visit.  GEN: This is a well developed, well-nourished male in no acute distress, in a pleasant mood.    SKIN: Acne exam, which includes the face, neck, upper central chest, and upper central back was performed.  - [place] Skin Type 2  - Scattered perifollicular pustules on the crown of scalp, occiput, and upper neck/back/chest  - Red papule on the medial left malar cheek with central keratotic plug. No telangiectasias on dermoscopy  - No other lesions of concern on areas examined.     Impression/Plan:  1. Pityrosporum folliculitis     Condition is well controlled today. Has some new pustules on upper back and posterior neck. Will continue with Bactrim taper and initiation  of topicals to help manage condition    Prescribed Bactrim DS every other day    Prescribed Clindamycin lotion to be used as spot treatment after shower    Recommended Benzoyl peroxide wash to be used daily in the shower on upper back, neck, chest, and face    Continue with ketoconazole shampoo once a week    RTC in 3 months    2. Papule on the left medial malar cheek    Based off H&P, most likely represents early rosacea papule/pustule. Will continue to monitor and reassess at next office visit    Follow-up in 3 months, earlier for new or changing lesions.     Staff Involved:  Patient was seen and staffed with attending physician Dr. Tyrese Marroquin MD  Med/Derm Resident PGY-2  P:[phone]    Staff Addendum:  I, Charlene Xiong MD, saw this patient with the resident and agree with the resident s findings and plan of care as documented in the resident s note.

## 2019-08-12 NOTE — PATIENT INSTRUCTIONS
1. Folliculitis  - We're going to change the bactrim to every other day  - We're also going to give you a new prescription (clindamycin lotion) to be used as a spot treatment on face/scalp/neck  - In addition, please use benzoyl peroxide wash in the shower (may be over the counter) - Panoxyl if you'd like    2. Spot on left cheek   - We're going to keep watching this spot on your left cheek. If it grows in size, bleeds, or becomes very painful, please let us know

## 2019-09-28 ENCOUNTER — HEALTH MAINTENANCE LETTER (OUTPATIENT)
Age: 79
End: 2019-09-28

## 2019-11-12 ENCOUNTER — OFFICE VISIT (OUTPATIENT)
Dept: DERMATOLOGY | Facility: CLINIC | Age: 79
End: 2019-11-12
Payer: COMMERCIAL

## 2019-11-12 DIAGNOSIS — L73.9 FOLLICULITIS: ICD-10-CM

## 2019-11-12 ASSESSMENT — PAIN SCALES - GENERAL: PAINLEVEL: NO PAIN (0)

## 2019-11-12 NOTE — NURSING NOTE
Chief Complaint   Patient presents with     Derm Problem     Tee is here today for Folliculitis follow up. Tee has some questions about his medications.      Tabby Barreto LPN

## 2019-11-12 NOTE — PROGRESS NOTES
[facility] Dermatology Note      Dermatology Problem List:  1. Pityrosporum Folliculitis with rosacea component, involvement of scalp and face.  - Pityrosporum biopsy proven many years ago. Last CBC and BMP 11/30/2018 were normal.   - Current Tx: bactrim DS every other day, clindamycin lotion, BPO wash  2.  Seborrheic dermatitis  - Current Tx: 2% ketoconazole shampoo.  3. Inflamed seborrheic keratosis  - Cryotherapy to lesion on left ear on 8/3/18    Encounter Date: Nov 12, 2019    CC:  Chief Complaint   Patient presents with     Derm Problem     Tee is here today for Folliculitis follow up. Tee has some questions about his medications.          History of Present Illness:  Mr. Tee Larkin is a 79 year old male who presents as a follow-up for pityrosporum folliculitis. The patient was last seen 8/12/19 when he was prescribed Clindamycin lotion to be used as spot treatment after shower, tapered Bactrim DS to every other day, recommended Benzoyl peroxide wash to be used daily in the shower, and continue ketoconazole shampoo once a week. Patient reports that he was unable to  Clindamycin because the prescription was not received by pharmacy. Today he reports no itch or tenderness on scalp. Patient reports using Benzoyl peroxide wash initially but stopped using it because it had no effect and he was concerned it would bleach hair. He has been taking Bactrim DS 80 mg daily and ketoconazole shampoo 2-3 x/week. Patient reports no other concerns.      On ROS, he reports diarrhea due to painful cyst in rectum that is alleviated with loose stools and so uses prune juice as stool softener. He denies fever, chills, fatigue, night sweats, unexpected weight loss, SOB, chest pain, cough, abdominal pain, N/V, dysuria, and arthralgias.    Past Medical History:   Patient Active Problem List   Diagnosis     Other specified disease of hair and hair follicles     No past medical history on file.  No past  surgical history on file.    Social History:  Patient reports that he has quit smoking. His smoking use included cigarettes. He has never used smokeless tobacco.    Family History:  Family History   Problem Relation Age of Onset     Cancer No family hx of         no skin cancer     Skin Cancer No family hx of      Melanoma No family hx of        Medications:  Current Outpatient Medications   Medication Sig Dispense Refill     aspirin 81 MG tablet Take 81 mg by mouth daily.       benzoyl peroxide 5 % external liquid Use daily in the shower on your upper back, neck, chest and posterior scalp 226 g 1     clobetasol (TEMOVATE) 0.05 % external solution Apply several drops over scalp to spread think layer-15 minutes before shower Twice weekly 59 mL 6     ketoconazole (NIZORAL) 2 % shampoo Use as shampoo daily (Patient taking differently: once a week Use as shampoo daily) 120 mL 11     LOSARTAN POTASSIUM PO Take  by mouth.       metoprolol (LOPRESSOR) 25 MG tablet Take 25 mg by mouth 2 times daily.       nitroGLYCERIN (NITROSTAT) 0.4 MG SL tablet Place 0.4 mg under the tongue every 5 minutes as needed.       Omeprazole 20 MG tablet Take 20 mg by mouth daily.       rosuvastatin (CRESTOR) 40 MG tablet Take 40 mg by mouth daily.       Shampoos (FREE & CLEAR) SHAM Externally apply  topically. As directed       sulfamethoxazole-trimethoprim (BACTRIM DS/SEPTRA DS) 800-160 MG tablet Take 1 tablet by mouth every other day 45 tablet 0     clindamycin (CLEOCIN T) 1 % external lotion Apply topically daily To be applied after the shower on new spots (Patient not taking: Reported on 11/12/2019) 60 mL 3     Allergies   Allergen Reactions     Penicillins Hives     Chocolate Rash     Nuts Rash         Review of Systems:  -As per HPI  -Constitutional: Otherwise feeling well today, in usual state of health.  -HEENT: Patient denies nonhealing oral sores.  -Skin: As above in HPI. No additional skin concerns.    Physical exam:  Vitals: There  were no vitals taken for this visit.  GEN: This is a well developed, well-nourished male in no acute distress, in a pleasant mood.    SKIN: Focused examination of the face and scalp was performed.  -[place] skin type: II  -scalp is mildly erythematous  -on left medial cheek is a poorly demarcated red 2 mm papule but this seems to be different location compared to last visit and patient reports present for only 1 week.  -No other lesions of concern on areas examined.     Impression/Plan:  1. Pityrosporum folliculitis - well controlled today.    Start Clindamycin lotion to be used as treatment after shower    Taper Bactrim DS 80 mg to every other day with plan to wean off in future    Continue Benzoyl peroxide wash to be used twice weekly in the shower on upper back, neck, chest, and face    Continue ketoconazole shampoo twice a week    At next visit with PCP patient will have bloodwork for Bactrim use    2. Papule on the left medial malar cheek- appears to be different than location at last visit and patient reports present for only about a week.    Pictures taken this visit. Will continue to monitor and reassess at next office visit.    CC Referred MD Luis Angel  No address on file on close of this encounter.  Follow-up in 6 weeks, earlier for new or changing lesions.     Staff Involved:  I,Cornelius Marrero, saw and examined the patient in the presence of Dr. Xiong.    .I was present with the medical student who participated in the service and in the documentation of the note. I have verified the history and personally performed the physical exam and medical decision making. I agree with the assessment and plan of care as documented in the note.  Charlene Xiong MD

## 2019-11-12 NOTE — PATIENT INSTRUCTIONS
- Start clindamycin lotion; use as spot treatment after shower    - Continue ketoconazole shampoo 2x per week    - Continue benzoyl peroxide wash 2x per week    - Taper Bactrim DS to half-pill every other day    - Bloodwork at next visit with primary care physician due to Bactrim DS use    - Follow up with Dr. Xiong in 6 weeks

## 2019-11-12 NOTE — LETTER
11/12/2019       RE: Tee Larkin  [address]  [address]  [place] [address]     Dear Colleague,    Thank you for referring your patient, Tee Larkin, to the [facility] DERMATOLOGY at [facility]. Please see a copy of my visit note below.    [facility] Dermatology Note      Dermatology Problem List:  1. Pityrosporum Folliculitis with rosacea component, involvement of scalp and face.  - Pityrosporum biopsy proven many years ago. Last CBC and BMP 11/30/2018 were normal.   - Current Tx: bactrim DS every other day, clindamycin lotion, BPO wash  2.  Seborrheic dermatitis  - Current Tx: 2% ketoconazole shampoo.  3. Inflamed seborrheic keratosis  - Cryotherapy to lesion on left ear on 8/3/18    Encounter Date: Nov 12, 2019    CC:  Chief Complaint   Patient presents with     Derm Problem     Tee is here today for Folliculitis follow up. Tee has some questions about his medications.          History of Present Illness:  Mr. Tee Larkin is a 79 year old male who presents as a follow-up for pityrosporum folliculitis. The patient was last seen 8/12/19 when he was prescribed Clindamycin lotion to be used as spot treatment after shower, tapered Bactrim DS to every other day, recommended Benzoyl peroxide wash to be used daily in the shower, and continue ketoconazole shampoo once a week. Patient reports that he was unable to  Clindamycin because the prescription was not received by pharmacy. Today he reports no itch or tenderness on scalp. Patient reports using Benzoyl peroxide wash initially but stopped using it because it had no effect and he was concerned it would bleach hair. He has been taking Bactrim DS 80 mg daily and ketoconazole shampoo 2-3 x/week. Patient reports no other concerns.      On ROS, he reports diarrhea due to painful cyst in rectum that is alleviated with loose stools and so uses prune juice as stool softener. He denies fever,  chills, fatigue, night sweats, unexpected weight loss, SOB, chest pain, cough, abdominal pain, N/V, dysuria, and arthralgias.    Past Medical History:   Patient Active Problem List   Diagnosis     Other specified disease of hair and hair follicles     No past medical history on file.  No past surgical history on file.    Social History:  Patient reports that he has quit smoking. His smoking use included cigarettes. He has never used smokeless tobacco.    Family History:  Family History   Problem Relation Age of Onset     Cancer No family hx of         no skin cancer     Skin Cancer No family hx of      Melanoma No family hx of        Medications:  Current Outpatient Medications   Medication Sig Dispense Refill     aspirin 81 MG tablet Take 81 mg by mouth daily.       benzoyl peroxide 5 % external liquid Use daily in the shower on your upper back, neck, chest and posterior scalp 226 g 1     clobetasol (TEMOVATE) 0.05 % external solution Apply several drops over scalp to spread think layer-15 minutes before shower Twice weekly 59 mL 6     ketoconazole (NIZORAL) 2 % shampoo Use as shampoo daily (Patient taking differently: once a week Use as shampoo daily) 120 mL 11     LOSARTAN POTASSIUM PO Take  by mouth.       metoprolol (LOPRESSOR) 25 MG tablet Take 25 mg by mouth 2 times daily.       nitroGLYCERIN (NITROSTAT) 0.4 MG SL tablet Place 0.4 mg under the tongue every 5 minutes as needed.       Omeprazole 20 MG tablet Take 20 mg by mouth daily.       rosuvastatin (CRESTOR) 40 MG tablet Take 40 mg by mouth daily.       Shampoos (FREE & CLEAR) SHAM Externally apply  topically. As directed       sulfamethoxazole-trimethoprim (BACTRIM DS/SEPTRA DS) 800-160 MG tablet Take 1 tablet by mouth every other day 45 tablet 0     clindamycin (CLEOCIN T) 1 % external lotion Apply topically daily To be applied after the shower on new spots (Patient not taking: Reported on 11/12/2019) 60 mL 3     Allergies   Allergen Reactions      Penicillins Hives     Chocolate Rash     Nuts Rash         Review of Systems:  -As per HPI  -Constitutional: Otherwise feeling well today, in usual state of health.  -HEENT: Patient denies nonhealing oral sores.  -Skin: As above in HPI. No additional skin concerns.    Physical exam:  Vitals: There were no vitals taken for this visit.  GEN: This is a well developed, well-nourished male in no acute distress, in a pleasant mood.    SKIN: Focused examination of the face and scalp was performed.  -[place] skin type: II  -scalp is mildly erythematous  -on left medial cheek is a poorly demarcated red 2 mm papule but this seems to be different location compared to last visit and patient reports present for only 1 week.  -No other lesions of concern on areas examined.     Impression/Plan:  1. Pityrosporum folliculitis - well controlled today.    Start Clindamycin lotion to be used as treatment after shower    Taper Bactrim DS 80 mg to every other day with plan to wean off in future    Continue Benzoyl peroxide wash to be used twice weekly in the shower on upper back, neck, chest, and face    Continue ketoconazole shampoo twice a week    At next visit with PCP patient will have bloodwork for Bactrim use    2. Papule on the left medial malar cheek- appears to be different than location at last visit and patient reports present for only about a week.    Pictures taken this visit. Will continue to monitor and reassess at next office visit.    CC Referred MD Luis Angel  No address on file on close of this encounter.  Follow-up in 6 weeks, earlier for new or changing lesions.     Staff Involved:  I,Cornelius Marrero, saw and examined the patient in the presence of Dr. Xiong.    .I was present with the medical student who participated in the service and in the documentation of the note. I have verified the history and personally performed the physical exam and medical decision making. I agree with the assessment and plan of care  as documented in the note.    Charlene Xiong MD

## 2019-11-13 RX ORDER — CLINDAMYCIN PHOSPHATE 10 UG/ML
LOTION TOPICAL DAILY
Qty: 60 ML | Refills: 3 | Status: SHIPPED | OUTPATIENT
Start: 2019-11-13 | End: 2023-02-20

## 2019-12-30 ENCOUNTER — OFFICE VISIT (OUTPATIENT)
Dept: DERMATOLOGY | Facility: CLINIC | Age: 79
End: 2019-12-30
Payer: COMMERCIAL

## 2019-12-30 DIAGNOSIS — L71.9 ROSACEA: ICD-10-CM

## 2019-12-30 DIAGNOSIS — Z79.899 ENCOUNTER FOR LONG-TERM (CURRENT) USE OF HIGH-RISK MEDICATION: ICD-10-CM

## 2019-12-30 DIAGNOSIS — Z79.899 ENCOUNTER FOR LONG-TERM (CURRENT) USE OF HIGH-RISK MEDICATION: Primary | ICD-10-CM

## 2019-12-30 LAB
ALBUMIN SERPL-MCNC: 3.6 G/DL (ref 3.4–5)
ALP SERPL-CCNC: 101 U/L (ref 40–150)
ALT SERPL W P-5'-P-CCNC: 34 U/L (ref 0–70)
ANION GAP SERPL CALCULATED.3IONS-SCNC: 2 MMOL/L (ref 3–14)
AST SERPL W P-5'-P-CCNC: 29 U/L (ref 0–45)
BILIRUB SERPL-MCNC: 0.5 MG/DL (ref 0.2–1.3)
BUN SERPL-MCNC: 20 MG/DL (ref 7–30)
CALCIUM SERPL-MCNC: 8.6 MG/DL (ref 8.5–10.1)
CHLORIDE SERPL-SCNC: 109 MMOL/L (ref 94–109)
CO2 SERPL-SCNC: 31 MMOL/L (ref 20–32)
CREAT SERPL-MCNC: 0.87 MG/DL (ref 0.66–1.25)
ERYTHROCYTE [DISTWIDTH] IN BLOOD BY AUTOMATED COUNT: 12.1 % (ref 10–15)
GFR SERPL CREATININE-BSD FRML MDRD: 82 ML/MIN/{1.73_M2}
GLUCOSE SERPL-MCNC: 106 MG/DL (ref 70–99)
HCT VFR BLD AUTO: 46.8 % (ref 40–53)
HGB BLD-MCNC: 15.6 G/DL (ref 13.3–17.7)
MCH RBC QN AUTO: 33.1 PG (ref 26.5–33)
MCHC RBC AUTO-ENTMCNC: 33.3 G/DL (ref 31.5–36.5)
MCV RBC AUTO: 99 FL (ref 78–100)
PLATELET # BLD AUTO: 203 10E9/L (ref 150–450)
POTASSIUM SERPL-SCNC: 4.5 MMOL/L (ref 3.4–5.3)
PROT SERPL-MCNC: 6.5 G/DL (ref 6.8–8.8)
RBC # BLD AUTO: 4.71 10E12/L (ref 4.4–5.9)
SODIUM SERPL-SCNC: 141 MMOL/L (ref 133–144)
WBC # BLD AUTO: 6.7 10E9/L (ref 4–11)

## 2019-12-30 ASSESSMENT — PAIN SCALES - GENERAL: PAINLEVEL: NO PAIN (0)

## 2019-12-30 NOTE — PATIENT INSTRUCTIONS
Cut down the bactrim to half a pill every other day  Continue the clindamycin lotion as you are using in the shower  Put clindamycin lotion on the spot on your left cheek daily      Recheck in 4 weeks to make sure all is doing well with Bactrim decrease and recheck spot on left check.  If spot on left cheek not gone, plan for a biopsy

## 2019-12-30 NOTE — PROGRESS NOTES
[facility] Dermatology Note      Dermatology Problem List:  1. Pityrosporum Folliculitis with rosacea component, involvement of scalp and face.  - Pityrosporum biopsy proven many years ago. Last CBC and BMP 11/30/2018 were normal.   - Current Tx: bactrim DS (half tab) every other day, clindamycin lotion, BPO wash  2.  Seborrheic dermatitis  - Current Tx: 2% ketoconazole shampoo.  3. Inflamed seborrheic keratosis  - Cryotherapy to lesion on left ear on 8/3/18  4.  Papule left cheek 11/12/2019 improved 12/30/2019 but not fully resolved.    CC:   Chief Complaint   Patient presents with     Derm Problem     Tee is here for a follow up for folliculitis of his scalp, states it's stable.          Encounter Date: Dec 30, 2019    History of Present Illness:  Mr. Tee Larkin is a 79 year old male who presents for a recheck of his rosacea since decreasing his bactrim.  He reports instead of decreasing to every other day, he cut the pill in half and took it daily.  He is using the clindamycin lotion on his scalp in the shower at the beginning and rinsing it off at the end.  He notes no flares and is doing well.  We also took a picture of the papule on the left cheek.  He forgot about it and has not paid any attention to it.    Past Medical History:   Patient Active Problem List   Diagnosis     Other specified disease of hair and hair follicles     No past medical history on file.  No past surgical history on file.    Social History:  Patient reports that he has quit smoking. His smoking use included cigarettes. He has never used smokeless tobacco.    Family History:  Family History   Problem Relation Age of Onset     Cancer No family hx of         no skin cancer     Skin Cancer No family hx of      Melanoma No family hx of        Medications:  Current Outpatient Medications   Medication Sig Dispense Refill     aspirin 81 MG tablet Take 81 mg by mouth daily.       benzoyl peroxide 5 % external liquid Use  daily in the shower on your upper back, neck, chest and posterior scalp 226 g 1     clindamycin (CLEOCIN T) 1 % external lotion Apply topically daily To be applied after the shower on new spots 60 mL 3     clobetasol (TEMOVATE) 0.05 % external solution Apply several drops over scalp to spread think layer-15 minutes before shower Twice weekly 59 mL 6     ketoconazole (NIZORAL) 2 % shampoo Use as shampoo daily (Patient taking differently: once a week Use as shampoo daily) 120 mL 11     LOSARTAN POTASSIUM PO Take  by mouth.       metoprolol (LOPRESSOR) 25 MG tablet Take 25 mg by mouth 2 times daily.       nitroGLYCERIN (NITROSTAT) 0.4 MG SL tablet Place 0.4 mg under the tongue every 5 minutes as needed.       Omeprazole 20 MG tablet Take 20 mg by mouth daily.       rosuvastatin (CRESTOR) 40 MG tablet Take 40 mg by mouth daily.       Shampoos (FREE & CLEAR) SHAM Externally apply  topically. As directed       sulfamethoxazole-trimethoprim (BACTRIM DS/SEPTRA DS) 800-160 MG tablet Take 1 tablet by mouth every other day 45 tablet 0     Allergies   Allergen Reactions     Penicillins Hives     Chocolate Rash     Nuts Rash             Physical exam:  Vitals: There were no vitals taken for this visit.  GEN: This is a well developed, well-nourished male in no acute distress, in a pleasant mood.    SKIN: Focused examination of the scalp and face was performed.  -[place] skin type: I  -No active papules or pustules of the scalp.  Minimal erythema  - 3 mm papule left cheek, much less red and smaller compared to photo 11/12/2019.  Not tender or friable on palpation.  No specific features on dermoscopy  -No other lesions of concern on areas examined.     Impression/Plan:  1. Rosacea/folliculitis  - Controlled with decrease in bactrim and addition of clindamycin lotion.  Will continue wean of bactrim with decreasing dose to half tab of Bactrim every other day.  Clindamycin lotion daily in shower as he is doing with benzoyl  peroxide.  - Safety labs today (CBC,CMP)      2. Papule left cheek.    - It is improved by comparison to the photo but has not resolved.  Patient will use clindamycin lotion daily to area. If not resolved completely in 4 weeks, I discussed biopsy with the patient.      CC Referred Self, MD  No address on file on close of this encounter.  Follow-up in 4 weeks, earlier for new or changing lesions.       Staff Involved:  Staff Only

## 2019-12-30 NOTE — LETTER
12/30/2019      RE: Tee Larkin  [address]  [address]  [facility] [address]       [facility] Dermatology Note      Dermatology Problem List:  1. Pityrosporum Folliculitis with rosacea component, involvement of scalp and face.  - Pityrosporum biopsy proven many years ago. Last CBC and BMP 11/30/2018 were normal.   - Current Tx: bactrim DS (half tab) every other day, clindamycin lotion, BPO wash  2.  Seborrheic dermatitis  - Current Tx: 2% ketoconazole shampoo.  3. Inflamed seborrheic keratosis  - Cryotherapy to lesion on left ear on 8/3/18  4.  Papule left cheek 11/12/2019 improved 12/30/2019 but not fully resolved.    CC:   Chief Complaint   Patient presents with     Derm Problem     Tee is here for a follow up for folliculitis of his scalp, states it's stable.          Encounter Date: Dec 30, 2019    History of Present Illness:  Mr. Tee Larkin is a 79 year old male who presents for a recheck of his rosacea since decreasing his bactrim.  He reports instead of decreasing to every other day, he cut the pill in half and took it daily.  He is using the clindamycin lotion on his scalp in the shower at the beginning and rinsing it off at the end.  He notes no flares and is doing well.  We also took a picture of the papule on the left cheek.  He forgot about it and has not paid any attention to it.    Past Medical History:   Patient Active Problem List   Diagnosis     Other specified disease of hair and hair follicles     No past medical history on file.  No past surgical history on file.    Social History:  Patient reports that he has quit smoking. His smoking use included cigarettes. He has never used smokeless tobacco.    Family History:  Family History   Problem Relation Age of Onset     Cancer No family hx of         no skin cancer     Skin Cancer No family hx of      Melanoma No family hx of        Medications:  Current Outpatient Medications   Medication Sig Dispense Refill      aspirin 81 MG tablet Take 81 mg by mouth daily.       benzoyl peroxide 5 % external liquid Use daily in the shower on your upper back, neck, chest and posterior scalp 226 g 1     clindamycin (CLEOCIN T) 1 % external lotion Apply topically daily To be applied after the shower on new spots 60 mL 3     clobetasol (TEMOVATE) 0.05 % external solution Apply several drops over scalp to spread think layer-15 minutes before shower Twice weekly 59 mL 6     ketoconazole (NIZORAL) 2 % shampoo Use as shampoo daily (Patient taking differently: once a week Use as shampoo daily) 120 mL 11     LOSARTAN POTASSIUM PO Take  by mouth.       metoprolol (LOPRESSOR) 25 MG tablet Take 25 mg by mouth 2 times daily.       nitroGLYCERIN (NITROSTAT) 0.4 MG SL tablet Place 0.4 mg under the tongue every 5 minutes as needed.       Omeprazole 20 MG tablet Take 20 mg by mouth daily.       rosuvastatin (CRESTOR) 40 MG tablet Take 40 mg by mouth daily.       Shampoos (FREE & CLEAR) SHAM Externally apply  topically. As directed       sulfamethoxazole-trimethoprim (BACTRIM DS/SEPTRA DS) 800-160 MG tablet Take 1 tablet by mouth every other day 45 tablet 0     Allergies   Allergen Reactions     Penicillins Hives     Chocolate Rash     Nuts Rash             Physical exam:  Vitals: There were no vitals taken for this visit.  GEN: This is a well developed, well-nourished male in no acute distress, in a pleasant mood.    SKIN: Focused examination of the scalp and face was performed.  -[place] skin type: I  -No active papules or pustules of the scalp.  Minimal erythema  - 3 mm papule left cheek, much less red and smaller compared to photo 11/12/2019.  Not tender or friable on palpation.  No specific features on dermoscopy  -No other lesions of concern on areas examined.     Impression/Plan:  1. Rosacea/folliculitis  - Controlled with decrease in bactrim and addition of clindamycin lotion.  Will continue wean of bactrim with decreasing dose to half tab of  Bactrim every other day.  Clindamycin lotion daily in shower as he is doing with benzoyl peroxide.  - Safety labs today (CBC,CMP)      2. Papule left cheek.    - It is improved by comparison to the photo but has not resolved.  Patient will use clindamycin lotion daily to area. If not resolved completely in 4 weeks, I discussed biopsy with the patient.      CC Referred Self, MD  No address on file on close of this encounter.  Follow-up in 4 weeks, earlier for new or changing lesions.       Staff Involved:  Staff Only            Charlene Xiong MD

## 2019-12-30 NOTE — LETTER
12/30/2019       RE: Tee Larkin  [address]  [address]  [facility] [address]     Dear Colleague,    Thank you for referring your patient, Tee Larkin, to the [facility] DERMATOLOGY at [facility]. Please see a copy of my visit note below.    [facility] Dermatology Note      Dermatology Problem List:  1. Pityrosporum Folliculitis with rosacea component, involvement of scalp and face.  - Pityrosporum biopsy proven many years ago. Last CBC and BMP 11/30/2018 were normal.   - Current Tx: bactrim DS (half tab) every other day, clindamycin lotion, BPO wash  2.  Seborrheic dermatitis  - Current Tx: 2% ketoconazole shampoo.  3. Inflamed seborrheic keratosis  - Cryotherapy to lesion on left ear on 8/3/18  4.  Papule left cheek 11/12/2019 improved 12/30/2019 but not fully resolved.    CC:   Chief Complaint   Patient presents with     Derm Problem     Tee is here for a follow up for folliculitis of his scalp, states it's stable.          Encounter Date: Dec 30, 2019    History of Present Illness:  Mr. Tee Larkin is a 79 year old male who presents for a recheck of his rosacea since decreasing his bactrim.  He reports instead of decreasing to every other day, he cut the pill in half and took it daily.  He is using the clindamycin lotion on his scalp in the shower at the beginning and rinsing it off at the end.  He notes no flares and is doing well.  We also took a picture of the papule on the left cheek.  He forgot about it and has not paid any attention to it.    Past Medical History:   Patient Active Problem List   Diagnosis     Other specified disease of hair and hair follicles     No past medical history on file.  No past surgical history on file.    Social History:  Patient reports that he has quit smoking. His smoking use included cigarettes. He has never used smokeless tobacco.    Family History:  Family History   Problem Relation Age of Onset      Cancer No family hx of         no skin cancer     Skin Cancer No family hx of      Melanoma No family hx of        Medications:  Current Outpatient Medications   Medication Sig Dispense Refill     aspirin 81 MG tablet Take 81 mg by mouth daily.       benzoyl peroxide 5 % external liquid Use daily in the shower on your upper back, neck, chest and posterior scalp 226 g 1     clindamycin (CLEOCIN T) 1 % external lotion Apply topically daily To be applied after the shower on new spots 60 mL 3     clobetasol (TEMOVATE) 0.05 % external solution Apply several drops over scalp to spread think layer-15 minutes before shower Twice weekly 59 mL 6     ketoconazole (NIZORAL) 2 % shampoo Use as shampoo daily (Patient taking differently: once a week Use as shampoo daily) 120 mL 11     LOSARTAN POTASSIUM PO Take  by mouth.       metoprolol (LOPRESSOR) 25 MG tablet Take 25 mg by mouth 2 times daily.       nitroGLYCERIN (NITROSTAT) 0.4 MG SL tablet Place 0.4 mg under the tongue every 5 minutes as needed.       Omeprazole 20 MG tablet Take 20 mg by mouth daily.       rosuvastatin (CRESTOR) 40 MG tablet Take 40 mg by mouth daily.       Shampoos (FREE & CLEAR) SHAM Externally apply  topically. As directed       sulfamethoxazole-trimethoprim (BACTRIM DS/SEPTRA DS) 800-160 MG tablet Take 1 tablet by mouth every other day 45 tablet 0     Allergies   Allergen Reactions     Penicillins Hives     Chocolate Rash     Nuts Rash             Physical exam:  Vitals: There were no vitals taken for this visit.  GEN: This is a well developed, well-nourished male in no acute distress, in a pleasant mood.    SKIN: Focused examination of the scalp and face was performed.  -[place] skin type: I  -No active papules or pustules of the scalp.  Minimal erythema  - 3 mm papule left cheek, much less red and smaller compared to photo 11/12/2019.  Not tender or friable on palpation.  No specific features on dermoscopy  -No other lesions of concern on areas  examined.     Impression/Plan:  1. Rosacea/folliculitis  - Controlled with decrease in bactrim and addition of clindamycin lotion.  Will continue wean of bactrim with decreasing dose to half tab of Bactrim every other day.  Clindamycin lotion daily in shower as he is doing with benzoyl peroxide.  - Safety labs today (CBC,CMP)      2. Papule left cheek.    - It is improved by comparison to the photo but has not resolved.  Patient will use clindamycin lotion daily to area. If not resolved completely in 4 weeks, I discussed biopsy with the patient.      CC Referred Self, MD  No address on file on close of this encounter.  Follow-up in 4 weeks, earlier for new or changing lesions.       Staff Involved:  Staff Only            Again, thank you for allowing me to participate in the care of your patient.      Sincerely,    Charlene Xiong MD

## 2019-12-30 NOTE — NURSING NOTE
Dermatology Rooming Note    Tee Larkin's goals for this visit include:   Chief Complaint   Patient presents with     Derm Problem     Tee is here for a follow up for folliculitis of his scalp, states it's stable.        Rosetta Jensen LPN

## 2020-03-15 ENCOUNTER — HEALTH MAINTENANCE LETTER (OUTPATIENT)
Age: 80
End: 2020-03-15

## 2020-08-17 DIAGNOSIS — L73.9 FOLLICULITIS: ICD-10-CM

## 2020-08-21 RX ORDER — SULFAMETHOXAZOLE/TRIMETHOPRIM 800-160 MG
1 TABLET ORAL DAILY
Qty: 90 TABLET | Refills: 0 | Status: SHIPPED | OUTPATIENT
Start: 2020-08-21 | End: 2021-02-17

## 2020-08-21 NOTE — TELEPHONE ENCOUNTER
Received refill request for Bactrim. Patient chart reviewed. Refill accepted with no refills. Patient overdue for virtual f/u. Rx routed to Dr. Tyrese Hendrickson MD  Dermatology Resident  [facility]

## 2020-08-21 NOTE — TELEPHONE ENCOUNTER
sulfamethoxazole-trimethoprim (BACTRIM DS) 800-160 MG tablet      Last Written Prescription Date:  8-12-19  Last Fill Quantity: 45,   # refills: 0  Last Office Visit : 12-30-19  Future Office visit:  none    Routing refill request to provider for review/approval because:  Not on protocol.        Kathleen M Doege RN

## 2020-11-02 ENCOUNTER — OFFICE VISIT (OUTPATIENT)
Dept: DERMATOLOGY | Facility: CLINIC | Age: 80
End: 2020-11-02
Payer: COMMERCIAL

## 2020-11-02 DIAGNOSIS — L73.9 FOLLICULITIS: Primary | ICD-10-CM

## 2020-11-02 DIAGNOSIS — L73.9 FOLLICULITIS: ICD-10-CM

## 2020-11-02 DIAGNOSIS — Z79.899 ENCOUNTER FOR LONG-TERM (CURRENT) USE OF HIGH-RISK MEDICATION: ICD-10-CM

## 2020-11-02 LAB
BASOPHILS # BLD AUTO: 0 10E9/L (ref 0–0.2)
BASOPHILS NFR BLD AUTO: 0.5 %
DIFFERENTIAL METHOD BLD: NORMAL
EOSINOPHIL # BLD AUTO: 0.1 10E9/L (ref 0–0.7)
EOSINOPHIL NFR BLD AUTO: 1.2 %
ERYTHROCYTE [DISTWIDTH] IN BLOOD BY AUTOMATED COUNT: 12 % (ref 10–15)
HCT VFR BLD AUTO: 48.9 % (ref 40–53)
HGB BLD-MCNC: 16.2 G/DL (ref 13.3–17.7)
IMM GRANULOCYTES # BLD: 0 10E9/L (ref 0–0.4)
IMM GRANULOCYTES NFR BLD: 0.4 %
LYMPHOCYTES # BLD AUTO: 1.2 10E9/L (ref 0.8–5.3)
LYMPHOCYTES NFR BLD AUTO: 16.2 %
MCH RBC QN AUTO: 33 PG (ref 26.5–33)
MCHC RBC AUTO-ENTMCNC: 33.1 G/DL (ref 31.5–36.5)
MCV RBC AUTO: 100 FL (ref 78–100)
MONOCYTES # BLD AUTO: 0.7 10E9/L (ref 0–1.3)
MONOCYTES NFR BLD AUTO: 9.9 %
NEUTROPHILS # BLD AUTO: 5.2 10E9/L (ref 1.6–8.3)
NEUTROPHILS NFR BLD AUTO: 71.8 %
NRBC # BLD AUTO: 0 10*3/UL
NRBC BLD AUTO-RTO: 0 /100
PLATELET # BLD AUTO: 200 10E9/L (ref 150–450)
RBC # BLD AUTO: 4.91 10E12/L (ref 4.4–5.9)
WBC # BLD AUTO: 7.3 10E9/L (ref 4–11)

## 2020-11-02 PROCEDURE — 85025 COMPLETE CBC W/AUTO DIFF WBC: CPT | Performed by: PATHOLOGY

## 2020-11-02 PROCEDURE — 36415 COLL VENOUS BLD VENIPUNCTURE: CPT | Performed by: PATHOLOGY

## 2020-11-02 PROCEDURE — 99213 OFFICE O/P EST LOW 20 MIN: CPT | Performed by: DERMATOLOGY

## 2020-11-02 ASSESSMENT — PAIN SCALES - GENERAL: PAINLEVEL: NO PAIN (0)

## 2020-11-02 NOTE — PROGRESS NOTES
[facility] Dermatology Note      Dermatology Problem List:  1. Pityrosporum Folliculitis with rosacea component, involvement of scalp and face.  - Pityrosporum biopsy proven many years ago. Last CBC and BMP 11/30/2018 were normal.   - Current Tx: bactrim DS (half tab) daily, BPO wash  2.  Seborrheic dermatitis  - Current Tx: 2% ketoconazole shampoo.  3. Inflamed seborrheic keratosis  - Cryotherapy to lesion on left ear on 8/3/18  4.  Papule left cheek 11/12/2019 improved 12/30/2019 but not fully resolved.    CC:   Chief Complaint   Patient presents with     Derm Problem     Tee is here for a follow up for his scalp, states his scalp flares up more when he eats chocolate, and he thinks he may be allergic to chocolate. States he needs more instructions about how to use the benzoyl peroxide.            Encounter Date: Nov 2, 2020    History of Present Illness:  Mr. Tee Larkin is a 80 year old male who returns for follow up rosacea/folliculitis.  He still has triggers with chocolate and nuts but has been able to cut down his dose of Bactrim DS to have a tablet daily with no large flares.  When he stopped the medication completely, he flared.  He is using the BPO wash in the shower.  Overall, he is doing well and continuing to work as a .    Past Medical History:   Patient Active Problem List   Diagnosis     Other specified disease of hair and hair follicles     No past medical history on file.  No past surgical history on file.    Social History:  Patient reports that he has quit smoking. His smoking use included cigarettes. He has never used smokeless tobacco.    Family History:  Family History   Problem Relation Age of Onset     Cancer No family hx of         no skin cancer     Skin Cancer No family hx of      Melanoma No family hx of        Medications:  Current Outpatient Medications   Medication Sig Dispense Refill     aspirin 81 MG tablet Take 81 mg by mouth daily.        benzoyl peroxide 5 % external liquid Use daily in the shower on your upper back, neck, chest and posterior scalp 226 g 1     clindamycin (CLEOCIN T) 1 % external lotion Apply topically daily To be applied after the shower on new spots 60 mL 3     clobetasol (TEMOVATE) 0.05 % external solution Apply several drops over scalp to spread think layer-15 minutes before shower Twice weekly 59 mL 6     ketoconazole (NIZORAL) 2 % shampoo Use as shampoo daily (Patient taking differently: once a week Use as shampoo daily) 120 mL 11     LOSARTAN POTASSIUM PO Take  by mouth.       metoprolol (LOPRESSOR) 25 MG tablet Take 25 mg by mouth 2 times daily.       nitroGLYCERIN (NITROSTAT) 0.4 MG SL tablet Place 0.4 mg under the tongue every 5 minutes as needed.       Omeprazole 20 MG tablet Take 20 mg by mouth daily.       rosuvastatin (CRESTOR) 40 MG tablet Take 40 mg by mouth daily.       Shampoos (FREE & CLEAR) SHAM Externally apply  topically. As directed       sulfamethoxazole-trimethoprim (BACTRIM DS) 800-160 MG tablet Take 1 tablet by mouth daily (Patient taking differently: Take 0.5 tablets by mouth daily ) 90 tablet 0     Allergies   Allergen Reactions     Penicillins Hives     Chocolate Rash     Nuts Rash             Physical exam:  Vitals: There were no vitals taken for this visit.  GEN: This is a well developed, well-nourished male in no acute distress, in a pleasant mood.    SKIN: Focused examination of the scalp and face was performed.  -[place] skin type: I  -few resolving crusts of the scalp  -No other lesions of concern on areas examined.     Labs:  CBC today is normal  Creatinine 8/31/2020 is normal.    Impression/Plan:  1. Rosacea/folliculitis- improved  - Continue the Bactrim DS half tablet daily  -Continue the BPO wash in the shower.  Reviewed with the patient how to use it  -  CC Referred Self, MD  No address on file on close of this encounter.  Follow-up in 6 months, earlier for new or changing lesions.        Staff Involved:  Staff Only

## 2020-11-02 NOTE — NURSING NOTE
Dermatology Rooming Note    Tee Larkin's goals for this visit include:   Chief Complaint   Patient presents with     Derm Problem     Tee is here for a follow up for his scalp, states his scalp flares up more when he eats chocolate, and he thinks he may be allergic to chocolate. States he needs more instructions about how to use the benzoyl peroxide.          Rosetta Jensen, CHAYON

## 2020-11-02 NOTE — LETTER
11/2/2020       RE: Tee Larkin  [address]  [address]  [facility] [address]     Dear Colleague,    Thank you for referring your patient, Tee Larkin, to the [facility] DERMATOLOGY CLINIC [place] at [facility]. Please see a copy of my visit note below.    [facility] Dermatology Note      Dermatology Problem List:  1. Pityrosporum Folliculitis with rosacea component, involvement of scalp and face.  - Pityrosporum biopsy proven many years ago. Last CBC and BMP 11/30/2018 were normal.   - Current Tx: bactrim DS (half tab) daily, BPO wash  2.  Seborrheic dermatitis  - Current Tx: 2% ketoconazole shampoo.  3. Inflamed seborrheic keratosis  - Cryotherapy to lesion on left ear on 8/3/18  4.  Papule left cheek 11/12/2019 improved 12/30/2019 but not fully resolved.    CC:   Chief Complaint   Patient presents with     Derm Problem     Tee is here for a follow up for his scalp, states his scalp flares up more when he eats chocolate, and he thinks he may be allergic to chocolate. States he needs more instructions about how to use the benzoyl peroxide.            Encounter Date: Nov 2, 2020    History of Present Illness:  Mr. Tee Larkin is a 80 year old male who returns for follow up rosacea/folliculitis.  He still has triggers with chocolate and nuts but has been able to cut down his dose of Bactrim DS to have a tablet daily with no large flares.  When he stopped the medication completely, he flared.  He is using the BPO wash in the shower.  Overall, he is doing well and continuing to work as a .    Past Medical History:   Patient Active Problem List   Diagnosis     Other specified disease of hair and hair follicles     No past medical history on file.  No past surgical history on file.    Social History:  Patient reports that he has quit smoking. His smoking use included cigarettes. He has never used smokeless  tobacco.    Family History:  Family History   Problem Relation Age of Onset     Cancer No family hx of         no skin cancer     Skin Cancer No family hx of      Melanoma No family hx of        Medications:  Current Outpatient Medications   Medication Sig Dispense Refill     aspirin 81 MG tablet Take 81 mg by mouth daily.       benzoyl peroxide 5 % external liquid Use daily in the shower on your upper back, neck, chest and posterior scalp 226 g 1     clindamycin (CLEOCIN T) 1 % external lotion Apply topically daily To be applied after the shower on new spots 60 mL 3     clobetasol (TEMOVATE) 0.05 % external solution Apply several drops over scalp to spread think layer-15 minutes before shower Twice weekly 59 mL 6     ketoconazole (NIZORAL) 2 % shampoo Use as shampoo daily (Patient taking differently: once a week Use as shampoo daily) 120 mL 11     LOSARTAN POTASSIUM PO Take  by mouth.       metoprolol (LOPRESSOR) 25 MG tablet Take 25 mg by mouth 2 times daily.       nitroGLYCERIN (NITROSTAT) 0.4 MG SL tablet Place 0.4 mg under the tongue every 5 minutes as needed.       Omeprazole 20 MG tablet Take 20 mg by mouth daily.       rosuvastatin (CRESTOR) 40 MG tablet Take 40 mg by mouth daily.       Shampoos (FREE & CLEAR) SHAM Externally apply  topically. As directed       sulfamethoxazole-trimethoprim (BACTRIM DS) 800-160 MG tablet Take 1 tablet by mouth daily (Patient taking differently: Take 0.5 tablets by mouth daily ) 90 tablet 0     Allergies   Allergen Reactions     Penicillins Hives     Chocolate Rash     Nuts Rash             Physical exam:  Vitals: There were no vitals taken for this visit.  GEN: This is a well developed, well-nourished male in no acute distress, in a pleasant mood.    SKIN: Focused examination of the scalp and face was performed.  -[place] skin type: I  -few resolving crusts of the scalp  -No other lesions of concern on areas examined.     Labs:  CBC today is normal  Creatinine 8/31/2020  is normal.    Impression/Plan:  1. Rosacea/folliculitis- improved  - Continue the Bactrim DS half tablet daily  -Continue the BPO wash in the shower.  Reviewed with the patient how to use it  -  CC Referred Self, MD  No address on file on close of this encounter.  Follow-up in 6 months, earlier for new or changing lesions.       Staff Involved:  Staff Only

## 2021-02-17 DIAGNOSIS — L73.9 FOLLICULITIS: ICD-10-CM

## 2021-02-17 RX ORDER — SULFAMETHOXAZOLE/TRIMETHOPRIM 800-160 MG
0.5 TABLET ORAL DAILY
Qty: 60 TABLET | Refills: 0 | Status: SHIPPED | OUTPATIENT
Start: 2021-02-17 | End: 2021-05-10

## 2021-02-17 NOTE — TELEPHONE ENCOUNTER
sulfamethoxazole-trimethoprim (BACTRIM DS) 800-160 MG tablet      Last Written Prescription Date:  8/21/20  Last Fill Quantity: 90,   # refills: 0  Last Office Visit : 11/2/20 recommended 6 month follow up  Future Office visit:  None scheduled    Routing refill request to provider for review/approval because:  Drug not on the [facility], [facility] or [facility] refill protocol for folliculitis  Patient reported on 11/2/20 taking 0.5 tab daily   No changes made to requested refill    11/2/20 dictation note:  Impression/Plan:  1. Rosacea/folliculitis- improved  - Continue the Bactrim DS half tablet daily  -Continue the BPO wash in the shower.  Reviewed with the patient how to use it

## 2021-02-18 NOTE — TELEPHONE ENCOUNTER
Received refill request for Bactrim as LYNNETTE. Last dermatology clinic note reviewed. Plan was to continue this medication per last clinic note at a dose of 1/2 tablet daily. Limited refill provided to bridge to next clinic visit.     Next clinic visit not yet scheduled. Forwarding to clinical [place] for assistance with scheduling a follow-up appointment. Please schedule patient with Dr. Xiong in April/May 2021.     Attending Dr. Xiong CC'd as FYI.     Rebeca Gonzalez MD  Dermatology Resident

## 2021-05-08 ENCOUNTER — HEALTH MAINTENANCE LETTER (OUTPATIENT)
Age: 81
End: 2021-05-08

## 2021-05-10 DIAGNOSIS — L73.9 FOLLICULITIS: ICD-10-CM

## 2021-05-10 NOTE — TELEPHONE ENCOUNTER
sulfamethoxazole-trimethoprim (BACTRIM DS) 800-160 MG tablet      Last Written Prescription Date:  2/17/2021  Last Fill Quantity: 60 tab,   # refills: 0  Last Office Visit : 11/2/2020  Future Office visit:  8/16/2021    Routing refill request to provider for review/approval because:  Medication not on the Dermatology refill protocol.  - plan last visit 11/2/2020 continue Bactrim DS 1/2 tab QD.  - future visit 8/16/2021  - last dispense/pharmacy 2/18/2021 qty #45

## 2021-05-13 RX ORDER — SULFAMETHOXAZOLE/TRIMETHOPRIM 800-160 MG
0.5 TABLET ORAL DAILY
Qty: 45 TABLET | Refills: 0 | Status: SHIPPED | OUTPATIENT
Start: 2021-05-13 | End: 2021-08-11

## 2021-05-13 NOTE — TELEPHONE ENCOUNTER
I received this refill request as the dermatology resident on call. Chart reviewed. Patient last seen 11/2/20. Next appointment 8/16/2021. Will provide courtesy refill x 3 months to get to next appointment.     Mati Tucker MD, PhD  Med-Derm, PGY-5

## 2021-08-16 ENCOUNTER — OFFICE VISIT (OUTPATIENT)
Dept: DERMATOLOGY | Facility: CLINIC | Age: 81
End: 2021-08-16
Payer: COMMERCIAL

## 2021-08-16 DIAGNOSIS — L73.9 FOLLICULITIS: ICD-10-CM

## 2021-08-16 DIAGNOSIS — L71.9 ROSACEA: ICD-10-CM

## 2021-08-16 DIAGNOSIS — Z79.899 ENCOUNTER FOR LONG-TERM (CURRENT) USE OF HIGH-RISK MEDICATION: Primary | ICD-10-CM

## 2021-08-16 PROCEDURE — 99214 OFFICE O/P EST MOD 30 MIN: CPT | Performed by: DERMATOLOGY

## 2021-08-16 RX ORDER — SULFAMETHOXAZOLE/TRIMETHOPRIM 800-160 MG
TABLET ORAL
COMMUNITY
Start: 2020-08-31 | End: 2021-08-16

## 2021-08-16 RX ORDER — SULFAMETHOXAZOLE/TRIMETHOPRIM 800-160 MG
TABLET ORAL
Qty: 30 TABLET | Refills: 3 | Status: SHIPPED | OUTPATIENT
Start: 2021-08-16 | End: 2022-02-14

## 2021-08-16 ASSESSMENT — PAIN SCALES - GENERAL: PAINLEVEL: NO PAIN (0)

## 2021-08-16 NOTE — PATIENT INSTRUCTIONS
Use benzoyl peroxide wash every other day   Take 1/2 tab of the Bactrim daily     Labs today downstairs       Dry Skin    What is dry skin?    Common skin problem    Can be worse during the winter     Affects all ages    Occurs in people with or without other skin problems    What does it look like?    Fine lines in the skin become more visible     Rough feeling skin     Flaky skin    Most common on the arms and legs    Skin can become cracked, especially on the hands and feet    What are some problems caused by dry skin?     Itching    Rubbing or scratching can cause thickened, rough skin patches    Cracks in skin can be painful    Red, itchy, scaly skin (called eczema) can occur    Yellow crusting or pus could be signs of an infection    What causes dry skin?    A lack of water in the top layer of the skin    Too much soapy water,  hot water, or harsh chemicals    Aging and sun damage    How do I treat dry skin?    Shower or bathe daily for under ten minutes with lukewarm water and mild soap.    Pat yourself dry with a towel gently and leave your skin slightly damp.    Use moisturizing cream or ointment right away.  Avoid lotions.    What kind of mild soap should I be using?    Camay , Dove , Tone , Neutrogena , Purpose , or Oil of Olay     A non-detergent cleanser, like Cetaphil , can be used.    What should I stay away from?    Scented soaps     Bath oils    What moisturizers should I be using?    Cetaphil Cream,CeraVe Cream, Vanicream, Aquaphilic, Eucerin, Aquaphor, or Vaseline     Always apply after showering or bathing.    Reapply throughout the day, if possible.    If dry skin affects your hands, always reapply after handwashing.    What else should I know?    Using a humidifier during winter months may help.    If dry skin gets worse or if eczema develops, a steroid cream may be needed.

## 2021-08-16 NOTE — PROGRESS NOTES
[facility] Dermatology Note      Dermatology Problem List:  1. Pityrosporum Folliculitis with rosacea component, involvement of scalp and face.  - Pityrosporum biopsy proven many years ago.   - Current Tx: bactrim DS (half tab) daily, BPO wash every other day   - Monitoring labs drawn 8/16/21  2.  Seborrheic dermatitis  - Current Tx: 2% ketoconazole shampoo.  3. Inflamed seborrheic keratosis  - Cryotherapy to lesion on left ear on 8/3/18  4.  Papule left cheek 11/12/2019 improved 12/30/2019 but not fully resolved.    CC:   Chief Complaint   Patient presents with     Derm Problem     Pityrosporum Folliculitis with rosacea component - Tee states there has been no change. There is a new lesion on his forehead.         Encounter Date: Aug 16, 2021    History of Present Illness:  Mr. Tee Larkin is a 81 year old male who returns for follow up rosacea/folliculitis.  He reports improvement today since last visit.  He still has triggers with chocolate and nuts.  He is taking 1/2 tablet of Bactrim daily. No side effects. When he stopped the medication completely, he flared.  He is using the BPO wash in the shower every other day but has some questions about use.  Overall, he is doing well and continuing to work as a .    Past Medical History:   Patient Active Problem List   Diagnosis     Other specified disease of hair and hair follicles     No past medical history on file.  No past surgical history on file.    Social History:  Patient reports that he has quit smoking. His smoking use included cigarettes. He has never used smokeless tobacco.    Family History:  Family History   Problem Relation Age of Onset     Cancer No family hx of         no skin cancer     Skin Cancer No family hx of      Melanoma No family hx of        Medications:  Current Outpatient Medications   Medication Sig Dispense Refill     aspirin 81 MG tablet Take 81 mg by mouth daily.       benzoyl peroxide 5 % external  liquid Use daily in the shower on your upper back, neck, chest and posterior scalp 226 g 1     clindamycin (CLEOCIN T) 1 % external lotion Apply topically daily To be applied after the shower on new spots 60 mL 3     clobetasol (TEMOVATE) 0.05 % external solution Apply several drops over scalp to spread think layer-15 minutes before shower Twice weekly 59 mL 6     ketoconazole (NIZORAL) 2 % shampoo Use as shampoo daily (Patient taking differently: once a week Use as shampoo daily) 120 mL 11     LOSARTAN POTASSIUM PO Take  by mouth.       metoprolol (LOPRESSOR) 25 MG tablet Take 25 mg by mouth 2 times daily.       nitroGLYCERIN (NITROSTAT) 0.4 MG SL tablet Place 0.4 mg under the tongue every 5 minutes as needed.       Omeprazole 20 MG tablet Take 20 mg by mouth daily.       rosuvastatin (CRESTOR) 40 MG tablet Take 40 mg by mouth daily.       Shampoos (FREE & CLEAR) SHAM Externally apply  topically. As directed       sulfamethoxazole-trimethoprim (BACTRIM DS) 800-160 MG tablet Take 1/2 tab daily       Allergies   Allergen Reactions     Penicillins Hives     Chocolate Rash     Nuts Rash     Physical exam:  Vitals: There were no vitals taken for this visit.  GEN: This is a well developed, well-nourished male in no acute distress, in a pleasant mood.    SKIN: Focused examination of the scalp and face was performed.  -[place] skin type: I  -Few 2 to 3 mm papules/pustules some with overlying red crust on the central forehead, left and right parietal scalp  -No other lesions of concern on areas examined.     Labs:  CBC RESULTS: Recent Labs   Lab Test 11/02/20  1140   WBC 7.3   RBC 4.91   HGB 16.2   HCT 48.9      MCH 33.0   MCHC 33.1   RDW 12.0        Lab Results   Component Value Date    CR 0.87 12/30/2019         Impression/Plan:  1. Rosacea/folliculitis-some activity today, overall well controlled  - Continue the Bactrim DS half tablet daily  -Continue the BPO wash in the shower.  Reviewed with the  patient how to use it   -Monitoring labs today: CBC w/ diff and Cr      CC Referred Self, MD  No address on file on close of this encounter.    Follow-up in 6 months, earlier for new or changing lesions.       Staff and Medical Student Involved:    Tee Jolley MS4    I was present with the medical student who participated in the service and in the documentation of the note. I have verified the history and personally performed the physical exam and medical decision making. I agree with the assessment and plan of care as documented in the note.  Charlene Xiong MD

## 2021-08-16 NOTE — LETTER
8/16/2021       RE: Tee Larkin  [address]  [address]  [facility] [address]     Dear Colleague,    Thank you for referring your patient, Tee Larkin, to the [facility] DERMATOLOGY CLINIC [place] at [facility]. Please see a copy of my visit note below.    [facility] Dermatology Note      Dermatology Problem List:  1. Pityrosporum Folliculitis with rosacea component, involvement of scalp and face.  - Pityrosporum biopsy proven many years ago.   - Current Tx: bactrim DS (half tab) daily, BPO wash every other day   - Monitoring labs drawn 8/16/21  2.  Seborrheic dermatitis  - Current Tx: 2% ketoconazole shampoo.  3. Inflamed seborrheic keratosis  - Cryotherapy to lesion on left ear on 8/3/18  4.  Papule left cheek 11/12/2019 improved 12/30/2019 but not fully resolved.    CC:   Chief Complaint   Patient presents with     Derm Problem     Pityrosporum Folliculitis with rosacea component - Tee states there has been no change. There is a new lesion on his forehead.         Encounter Date: Aug 16, 2021    History of Present Illness:  Mr. Tee Larkin is a 81 year old male who returns for follow up rosacea/folliculitis.  He reports improvement today since last visit.  He still has triggers with chocolate and nuts.  He is taking 1/2 tablet of Bactrim daily. No side effects. When he stopped the medication completely, he flared.  He is using the BPO wash in the shower every other day but has some questions about use.  Overall, he is doing well and continuing to work as a .    Past Medical History:   Patient Active Problem List   Diagnosis     Other specified disease of hair and hair follicles     No past medical history on file.  No past surgical history on file.    Social History:  Patient reports that he has quit smoking. His smoking use included cigarettes. He has never used smokeless tobacco.    Family  History:  Family History   Problem Relation Age of Onset     Cancer No family hx of         no skin cancer     Skin Cancer No family hx of      Melanoma No family hx of        Medications:  Current Outpatient Medications   Medication Sig Dispense Refill     aspirin 81 MG tablet Take 81 mg by mouth daily.       benzoyl peroxide 5 % external liquid Use daily in the shower on your upper back, neck, chest and posterior scalp 226 g 1     clindamycin (CLEOCIN T) 1 % external lotion Apply topically daily To be applied after the shower on new spots 60 mL 3     clobetasol (TEMOVATE) 0.05 % external solution Apply several drops over scalp to spread think layer-15 minutes before shower Twice weekly 59 mL 6     ketoconazole (NIZORAL) 2 % shampoo Use as shampoo daily (Patient taking differently: once a week Use as shampoo daily) 120 mL 11     LOSARTAN POTASSIUM PO Take  by mouth.       metoprolol (LOPRESSOR) 25 MG tablet Take 25 mg by mouth 2 times daily.       nitroGLYCERIN (NITROSTAT) 0.4 MG SL tablet Place 0.4 mg under the tongue every 5 minutes as needed.       Omeprazole 20 MG tablet Take 20 mg by mouth daily.       rosuvastatin (CRESTOR) 40 MG tablet Take 40 mg by mouth daily.       Shampoos (FREE & CLEAR) SHAM Externally apply  topically. As directed       sulfamethoxazole-trimethoprim (BACTRIM DS) 800-160 MG tablet Take 1/2 tab daily       Allergies   Allergen Reactions     Penicillins Hives     Chocolate Rash     Nuts Rash     Physical exam:  Vitals: There were no vitals taken for this visit.  GEN: This is a well developed, well-nourished male in no acute distress, in a pleasant mood.    SKIN: Focused examination of the scalp and face was performed.  -[place] skin type: I  -Few 2 to 3 mm papules/pustules some with overlying red crust on the central forehead, left and right parietal scalp  -No other lesions of concern on areas examined.     Labs:  CBC RESULTS: Recent Labs   Lab Test 11/02/20  1140   WBC 7.3   RBC  4.91   HGB 16.2   HCT 48.9      MCH 33.0   MCHC 33.1   RDW 12.0        Lab Results   Component Value Date    CR 0.87 12/30/2019         Impression/Plan:  1. Rosacea/folliculitis-some activity today, overall well controlled  - Continue the Bactrim DS half tablet daily  -Continue the BPO wash in the shower.  Reviewed with the patient how to use it   -Monitoring labs today: CBC w/ diff and Cr      CC Referred Self, MD  No address on file on close of this encounter.    Follow-up in 6 months, earlier for new or changing lesions.       Staff and Medical Student Involved:    Tee Jolley MS4    I was present with the medical student who participated in the service and in the documentation of the note. I have verified the history and personally performed the physical exam and medical decision making. I agree with the assessment and plan of care as documented in the note.  Charlene Xiong MD

## 2021-08-16 NOTE — NURSING NOTE
Dermatology Rooming Note    Tee Larkin's goals for this visit include:   Chief Complaint   Patient presents with     Derm Problem     Pityrosporum Folliculitis with rosacea component - Tee states there has been no change. There is a new lesion on his forehead.     [place] Keith, [facility]

## 2021-08-17 ENCOUNTER — LAB (OUTPATIENT)
Dept: LAB | Facility: CLINIC | Age: 81
End: 2021-08-17
Payer: COMMERCIAL

## 2021-08-17 DIAGNOSIS — Z79.899 ENCOUNTER FOR LONG-TERM (CURRENT) USE OF HIGH-RISK MEDICATION: ICD-10-CM

## 2021-08-17 LAB
BASOPHILS # BLD AUTO: 0 10E3/UL (ref 0–0.2)
BASOPHILS NFR BLD AUTO: 1 %
CREAT SERPL-MCNC: 0.89 MG/DL (ref 0.66–1.25)
EOSINOPHIL # BLD AUTO: 0.2 10E3/UL (ref 0–0.7)
EOSINOPHIL NFR BLD AUTO: 3 %
ERYTHROCYTE [DISTWIDTH] IN BLOOD BY AUTOMATED COUNT: 11.7 % (ref 10–15)
GFR SERPL CREATININE-BSD FRML MDRD: 81 ML/MIN/1.73M2
HCT VFR BLD AUTO: 46.8 % (ref 40–53)
HGB BLD-MCNC: 15.6 G/DL (ref 13.3–17.7)
IMM GRANULOCYTES # BLD: 0 10E3/UL
IMM GRANULOCYTES NFR BLD: 1 %
LYMPHOCYTES # BLD AUTO: 1.4 10E3/UL (ref 0.8–5.3)
LYMPHOCYTES NFR BLD AUTO: 21 %
MCH RBC QN AUTO: 33.8 PG (ref 26.5–33)
MCHC RBC AUTO-ENTMCNC: 33.3 G/DL (ref 31.5–36.5)
MCV RBC AUTO: 101 FL (ref 78–100)
MONOCYTES # BLD AUTO: 0.8 10E3/UL (ref 0–1.3)
MONOCYTES NFR BLD AUTO: 11 %
NEUTROPHILS # BLD AUTO: 4.3 10E3/UL (ref 1.6–8.3)
NEUTROPHILS NFR BLD AUTO: 63 %
NRBC # BLD AUTO: 0 10E3/UL
NRBC BLD AUTO-RTO: 0 /100
PLATELET # BLD AUTO: 208 10E3/UL (ref 150–450)
RBC # BLD AUTO: 4.62 10E6/UL (ref 4.4–5.9)
WBC # BLD AUTO: 6.7 10E3/UL (ref 4–11)

## 2021-08-17 PROCEDURE — 36415 COLL VENOUS BLD VENIPUNCTURE: CPT | Performed by: PATHOLOGY

## 2021-08-17 PROCEDURE — 82565 ASSAY OF CREATININE: CPT | Performed by: PATHOLOGY

## 2021-08-17 PROCEDURE — 85025 COMPLETE CBC W/AUTO DIFF WBC: CPT | Performed by: PATHOLOGY

## 2021-09-27 PROCEDURE — 93005 ELECTROCARDIOGRAM TRACING: CPT | Performed by: EMERGENCY MEDICINE

## 2021-09-27 PROCEDURE — 99284 EMERGENCY DEPT VISIT MOD MDM: CPT | Mod: 25 | Performed by: EMERGENCY MEDICINE

## 2021-09-27 PROCEDURE — 99285 EMERGENCY DEPT VISIT HI MDM: CPT | Mod: 25 | Performed by: EMERGENCY MEDICINE

## 2021-09-27 PROCEDURE — C9803 HOPD COVID-19 SPEC COLLECT: HCPCS | Performed by: EMERGENCY MEDICINE

## 2021-09-27 PROCEDURE — 93010 ELECTROCARDIOGRAM REPORT: CPT | Performed by: EMERGENCY MEDICINE

## 2021-09-28 ENCOUNTER — HOSPITAL ENCOUNTER (EMERGENCY)
Facility: CLINIC | Age: 81
Discharge: HOME | End: 2021-09-28
Attending: EMERGENCY MEDICINE | Admitting: EMERGENCY MEDICINE
Payer: COMMERCIAL

## 2021-09-28 ENCOUNTER — APPOINTMENT (OUTPATIENT)
Dept: GENERAL RADIOLOGY | Facility: CLINIC | Age: 81
End: 2021-09-28
Attending: EMERGENCY MEDICINE
Payer: COMMERCIAL

## 2021-09-28 VITALS
DIASTOLIC BLOOD PRESSURE: 98 MMHG | HEART RATE: 89 BPM | SYSTOLIC BLOOD PRESSURE: 168 MMHG | OXYGEN SATURATION: 98 % | TEMPERATURE: 98.6 F | RESPIRATION RATE: 16 BRPM

## 2021-09-28 DIAGNOSIS — R05.9 COUGH: ICD-10-CM

## 2021-09-28 DIAGNOSIS — R06.00 DYSPNEA, UNSPECIFIED TYPE: ICD-10-CM

## 2021-09-28 LAB
ALBUMIN SERPL-MCNC: 3.2 G/DL (ref 3.4–5)
ALP SERPL-CCNC: 95 U/L (ref 40–150)
ALT SERPL W P-5'-P-CCNC: 27 U/L (ref 0–70)
ANION GAP SERPL CALCULATED.3IONS-SCNC: 6 MMOL/L (ref 3–14)
AST SERPL W P-5'-P-CCNC: 24 U/L (ref 0–45)
ATRIAL RATE - MUSE: 70 BPM
BASOPHILS # BLD AUTO: 0 10E3/UL (ref 0–0.2)
BASOPHILS NFR BLD AUTO: 1 %
BILIRUB SERPL-MCNC: 0.5 MG/DL (ref 0.2–1.3)
BUN SERPL-MCNC: 17 MG/DL (ref 7–30)
CALCIUM SERPL-MCNC: 8.6 MG/DL (ref 8.5–10.1)
CHLORIDE BLD-SCNC: 108 MMOL/L (ref 94–109)
CO2 SERPL-SCNC: 26 MMOL/L (ref 20–32)
CREAT SERPL-MCNC: 0.73 MG/DL (ref 0.66–1.25)
D DIMER PPP FEU-MCNC: 0.38 UG/ML FEU (ref 0–0.5)
DIASTOLIC BLOOD PRESSURE - MUSE: NORMAL MMHG
EOSINOPHIL # BLD AUTO: 0.1 10E3/UL (ref 0–0.7)
EOSINOPHIL NFR BLD AUTO: 2 %
ERYTHROCYTE [DISTWIDTH] IN BLOOD BY AUTOMATED COUNT: 11.8 % (ref 10–15)
GFR SERPL CREATININE-BSD FRML MDRD: 88 ML/MIN/1.73M2
GLUCOSE BLD-MCNC: 110 MG/DL (ref 70–99)
HCT VFR BLD AUTO: 43.3 % (ref 40–53)
HGB BLD-MCNC: 14.7 G/DL (ref 13.3–17.7)
HOLD SPECIMEN: NORMAL
IMM GRANULOCYTES # BLD: 0 10E3/UL
IMM GRANULOCYTES NFR BLD: 0 %
INTERPRETATION ECG - MUSE: NORMAL
LIPASE SERPL-CCNC: 119 U/L (ref 73–393)
LYMPHOCYTES # BLD AUTO: 0.7 10E3/UL (ref 0.8–5.3)
LYMPHOCYTES NFR BLD AUTO: 10 %
MCH RBC QN AUTO: 33.8 PG (ref 26.5–33)
MCHC RBC AUTO-ENTMCNC: 33.9 G/DL (ref 31.5–36.5)
MCV RBC AUTO: 100 FL (ref 78–100)
MONOCYTES # BLD AUTO: 1 10E3/UL (ref 0–1.3)
MONOCYTES NFR BLD AUTO: 14 %
NEUTROPHILS # BLD AUTO: 5.5 10E3/UL (ref 1.6–8.3)
NEUTROPHILS NFR BLD AUTO: 73 %
NRBC # BLD AUTO: 0 10E3/UL
NRBC BLD AUTO-RTO: 0 /100
P AXIS - MUSE: 51 DEGREES
PLATELET # BLD AUTO: 187 10E3/UL (ref 150–450)
POTASSIUM BLD-SCNC: 3.6 MMOL/L (ref 3.4–5.3)
PR INTERVAL - MUSE: 248 MS
PROT SERPL-MCNC: 6.3 G/DL (ref 6.8–8.8)
QRS DURATION - MUSE: 136 MS
QT - MUSE: 432 MS
QTC - MUSE: 466 MS
R AXIS - MUSE: -11 DEGREES
RBC # BLD AUTO: 4.35 10E6/UL (ref 4.4–5.9)
SARS-COV-2 RNA RESP QL NAA+PROBE: NEGATIVE
SODIUM SERPL-SCNC: 140 MMOL/L (ref 133–144)
SYSTOLIC BLOOD PRESSURE - MUSE: NORMAL MMHG
T AXIS - MUSE: 11 DEGREES
TROPONIN I SERPL-MCNC: <0.015 UG/L (ref 0–0.04)
VENTRICULAR RATE- MUSE: 70 BPM
WBC # BLD AUTO: 7.5 10E3/UL (ref 4–11)

## 2021-09-28 PROCEDURE — 250N000009 HC RX 250: Performed by: EMERGENCY MEDICINE

## 2021-09-28 PROCEDURE — 71045 X-RAY EXAM CHEST 1 VIEW: CPT | Mod: 26 | Performed by: RADIOLOGY

## 2021-09-28 PROCEDURE — 250N000013 HC RX MED GY IP 250 OP 250 PS 637: Performed by: EMERGENCY MEDICINE

## 2021-09-28 PROCEDURE — 85025 COMPLETE CBC W/AUTO DIFF WBC: CPT | Performed by: EMERGENCY MEDICINE

## 2021-09-28 PROCEDURE — 71045 X-RAY EXAM CHEST 1 VIEW: CPT | Mod: 76

## 2021-09-28 PROCEDURE — 80053 COMPREHEN METABOLIC PANEL: CPT | Performed by: EMERGENCY MEDICINE

## 2021-09-28 PROCEDURE — 84484 ASSAY OF TROPONIN QUANT: CPT | Performed by: EMERGENCY MEDICINE

## 2021-09-28 PROCEDURE — 71045 X-RAY EXAM CHEST 1 VIEW: CPT

## 2021-09-28 PROCEDURE — U0003 INFECTIOUS AGENT DETECTION BY NUCLEIC ACID (DNA OR RNA); SEVERE ACUTE RESPIRATORY SYNDROME CORONAVIRUS 2 (SARS-COV-2) (CORONAVIRUS DISEASE [COVID-19]), AMPLIFIED PROBE TECHNIQUE, MAKING USE OF HIGH THROUGHPUT TECHNOLOGIES AS DESCRIBED BY CMS-2020-01-R: HCPCS | Performed by: EMERGENCY MEDICINE

## 2021-09-28 PROCEDURE — 83690 ASSAY OF LIPASE: CPT | Performed by: EMERGENCY MEDICINE

## 2021-09-28 PROCEDURE — 85379 FIBRIN DEGRADATION QUANT: CPT | Performed by: EMERGENCY MEDICINE

## 2021-09-28 PROCEDURE — 36415 COLL VENOUS BLD VENIPUNCTURE: CPT | Performed by: EMERGENCY MEDICINE

## 2021-09-28 RX ADMIN — LIDOCAINE HYDROCHLORIDE 30 ML: 20 SOLUTION ORAL; TOPICAL at 01:02

## 2021-09-28 NOTE — DISCHARGE INSTRUCTIONS
COVID was negative. Drink plenty of fluids. Follow up with your clinic doctor later this week. Return to the ER with any new or worsening symptoms, or any other concerns.

## 2021-09-28 NOTE — ED PROVIDER NOTES
[place] EMERGENCY DEPARTMENT ([facility])  9/27/21 ED    History     Chief Complaint   Patient presents with     Shortness of Breath     HPI  Tee Larkin is a 81 year old male with a past medical history of Mycobacterium avium-intracellulare infection (2009), essential hypertension, s/p drug eluting coronary stent (2011), and s/p CABG x2 (2009) who presents to the emergency department for evaluation of shortness of breath.  Patient has had congestion and shortness of breath for 1 day.  Patient denies any fevers.  Patient has an intermittent nonproductive cough.  Patient is a former smoker. He received his COVID-19 Moderna vaccination in March 2021.  He reports feeling irritation or burning type feelings in his lungs throughout the day which is triggering a cough.  The cough is dry and nonproductive.  No other chest pain aside from that.  He does report that he previously had a Nissen, but has been told it slipped, and they do not want to do another surgery.  He states he has had issues with GERD in the past, takes an antiacid for this now.  He states that over the course of the day he starting to feel like breathing is a little bit more challenging.  No stuffy nose or sore throat.  No bad taste in his mouth.  No loss of sensation of taste or smell.  No abdominal pain, nausea, vomiting, diarrhea.  No lower extremity pain or swelling.  He does take an aspirin a day, did take 1 today.  He states that given that the breathing feels a little bit harder tonight, he figured he better come in for evaluation.      Past Medical History  Past Medical History:   Diagnosis Date     Essential hypertension      Mycobacterium avium infection (H) 2018     S/P CABG (coronary artery bypass graft)     x2     Status post insertion of drug eluting coronary artery stent 2011     No past surgical history on file.  aspirin 81 MG tablet  benzoyl peroxide 5 % external liquid  clindamycin (CLEOCIN T) 1 % external  lotion  clobetasol (TEMOVATE) 0.05 % external solution  ketoconazole (NIZORAL) 2 % shampoo  LOSARTAN POTASSIUM PO  metoprolol (LOPRESSOR) 25 MG tablet  nitroGLYCERIN (NITROSTAT) 0.4 MG SL tablet  Omeprazole 20 MG tablet  rosuvastatin (CRESTOR) 40 MG tablet  Shampoos (FREE & CLEAR) SHAM  sulfamethoxazole-trimethoprim (BACTRIM DS) 800-160 MG tablet      Allergies   Allergen Reactions     Penicillins Hives     Chocolate Rash     Nuts Rash     Family History  Family History   Problem Relation Age of Onset     Cancer No family hx of         no skin cancer     Skin Cancer No family hx of      Melanoma No family hx of      Social History   Social History     Tobacco Use     Smoking status: Former Smoker     Types: Cigarettes     Smokeless tobacco: Never Used   Substance Use Topics     Alcohol use: Not on file     Drug use: Not on file      Past medical history, past surgical history, medications, allergies, family history, and social history were reviewed with the patient. No additional pertinent items.       Review of Systems  A complete review of systems was performed with pertinent positives and negatives noted in the HPI, and all other systems negative.    Physical Exam   BP: (!) 176/87  Pulse: 74  Temp: 98.6  F (37  C)  Resp: 16  SpO2: 95 %  Physical Exam  Constitutional:       General: He is not in acute distress.     Appearance: He is not diaphoretic.   HENT:      Head: Atraumatic.   Eyes:      General: No scleral icterus.  Cardiovascular:      Heart sounds: Normal heart sounds.   Pulmonary:      Effort: No respiratory distress.      Breath sounds: Normal breath sounds.   Abdominal:      Palpations: Abdomen is soft.      Tenderness: There is no abdominal tenderness.   Musculoskeletal:         General: No tenderness.   Skin:     General: Skin is warm.         ED Course      Procedures            EKG Interpretation:      Interpreted by Amie Gordon MD  Time reviewed: 0150  Symptoms at time of EKG: dyspnea    Rhythm: 1 degree AV block  Rate: 70  Axis: Normal  Ectopy: premature ventricular contractions (infrequent)  Conduction: right bundle branch block (complete)  ST Segments/ T Waves: TWI in V1-V2, biphasic T wave in V3  Q Waves: none  Comparison to prior:  No old ekg available    Clinical Impression: abnormal                   Results for orders placed or performed during the hospital encounter of 09/28/21   XR Chest Port 1 View     Status: None    Narrative    EXAM: XR CHEST PORT 1 VIEW  LOCATION: [facility]  DATE/TIME: 9/28/2021 1:29 AM    INDICATION: dyspnea  COMPARISON: None.      Impression    IMPRESSION: Median sternotomy wires. Cardiomegaly. Bibasilar atelectasis.   XR Chest 1 View     Status: None    Narrative    EXAM: XR CHEST 1 VIEW  LOCATION: [facility]  DATE/TIME: 9/28/2021 2:53 AM    INDICATION: sob - lateral view please  COMPARISON: Frontal view dated 09/20/2021      Impression    IMPRESSION: Median sternotomy and CABG. Normal heart size. No acute infiltrates or effusions on the lateral view.   Comprehensive metabolic panel     Status: Abnormal   Result Value Ref Range    Sodium 140 133 - 144 mmol/L    Potassium 3.6 3.4 - 5.3 mmol/L    Chloride 108 94 - 109 mmol/L    Carbon Dioxide (CO2) 26 20 - 32 mmol/L    Anion Gap 6 3 - 14 mmol/L    Urea Nitrogen 17 7 - 30 mg/dL    Creatinine 0.73 0.66 - 1.25 mg/dL    Calcium 8.6 8.5 - 10.1 mg/dL    Glucose 110 (H) 70 - 99 mg/dL    Alkaline Phosphatase 95 40 - 150 U/L    AST 24 0 - 45 U/L    ALT 27 0 - 70 U/L    Protein Total 6.3 (L) 6.8 - 8.8 g/dL    Albumin 3.2 (L) 3.4 - 5.0 g/dL    Bilirubin Total 0.5 0.2 - 1.3 mg/dL    GFR Estimate 88 >60 mL/min/1.73m2   Lipase     Status: Normal   Result Value Ref Range    Lipase 119 73 - 393 U/L   Troponin I     Status: Normal   Result Value Ref Range    Troponin I <0.015 0.000 - 0.045 ug/L   Symptomatic COVID-19 Virus (Coronavirus) by  PCR Nasopharyngeal     Status: Normal    Specimen: Nasopharyngeal; Swab   Result Value Ref Range    SARS CoV2 PCR Negative Negative    Narrative    Testing was performed using the Xpert Xpress SARS-CoV-2 Assay on the  Cepheid Gene-Xpert Instrument Systems. Additional information about  this Emergency Use Authorization (EUA) assay can be found via the Lab  Guide. This test should be ordered for the detection of SARS-CoV-2 in  individuals who meet SARS-CoV-2 clinical and/or epidemiological  criteria. Test performance is unknown in asymptomatic patients. This  test is for in vitro diagnostic use under the FDA EUA for  laboratories certified under CLIA to perform high complexity testing.  This test has not been FDA cleared or approved. A negative result  does not rule out the presence of PCR inhibitors in the specimen or  target RNA in concentration below the limit of detection for the  assay. The possibility of a false negative should be considered if  the patient's recent exposure or clinical presentation suggests  COVID-19. This test was validated by the [facility]. This laboratory is certified under  the Clinical Laboratory Improvement Amendments of 1988 (CLIA-88) as  qualified to perform high complexity laboratory testing.     CBC with platelets and differential     Status: Abnormal   Result Value Ref Range    WBC Count 7.5 4.0 - 11.0 10e3/uL    RBC Count 4.35 (L) 4.40 - 5.90 10e6/uL    Hemoglobin 14.7 13.3 - 17.7 g/dL    Hematocrit 43.3 40.0 - 53.0 %     78 - 100 fL    MCH 33.8 (H) 26.5 - 33.0 pg    MCHC 33.9 31.5 - 36.5 g/dL    RDW 11.8 10.0 - 15.0 %    Platelet Count 187 150 - 450 10e3/uL    % Neutrophils 73 %    % Lymphocytes 10 %    % Monocytes 14 %    % Eosinophils 2 %    % Basophils 1 %    % Immature Granulocytes 0 %    NRBCs per 100 WBC 0 <1 /100    Absolute Neutrophils 5.5 1.6 - 8.3 10e3/uL    Absolute Lymphocytes 0.7 (L) 0.8 - 5.3 10e3/uL     Absolute Monocytes 1.0 0.0 - 1.3 10e3/uL    Absolute Eosinophils 0.1 0.0 - 0.7 10e3/uL    Absolute Basophils 0.0 0.0 - 0.2 10e3/uL    Absolute Immature Granulocytes 0.0 <=0.0 10e3/uL    Absolute NRBCs 0.0 10e3/uL   Extra Blue Top Tube     Status: None   Result Value Ref Range    Hold Specimen JIC    D dimer quantitative     Status: Normal   Result Value Ref Range    D-Dimer Quantitative 0.38 0.00 - 0.50 ug/mL FEU    Narrative    This D-dimer assay is intended for use in conjunction with a clinical pretest probability assessment model to exclude pulmonary embolism (PE) and deep venous thrombosis (DVT) in outpatients suspected of PE or DVT. The cut-off value is 0.50 ug/mL FEU.   EKG 12 lead     Status: None (Preliminary result)   Result Value Ref Range    Systolic Blood Pressure  mmHg    Diastolic Blood Pressure  mmHg    Ventricular Rate 70 BPM    Atrial Rate 70 BPM    MI Interval 248 ms    QRS Duration 136 ms     ms    QTc 466 ms    P Axis 51 degrees    R AXIS -11 degrees    T Axis 11 degrees    Interpretation ECG       Sinus rhythm with 1st degree A-V block with occasional Premature ventricular complexes  Right bundle branch block  Abnormal ECG     CBC with platelets differential     Status: Abnormal    Narrative    The following orders were created for panel order CBC with platelets differential.  Procedure                               Abnormality         Status                     ---------                               -----------         ------                     CBC with platelets and d...[[id number]]  Abnormal            Final result                 Please view results for these tests on the individual orders.   [place] Draw *Canceled*     Status: None ()    Narrative    The following orders were created for panel order [place] Draw.  Procedure                               Abnormality         Status                     ---------                               -----------         ------                        Please view results for these tests on the individual orders.   [place] Draw     Status: None    Narrative    The following orders were created for panel order [place] Draw.  Procedure                               Abnormality         Status                     ---------                               -----------         ------                     Extra Blue Top Tube[[id number]]                              Final result                 Please view results for these tests on the individual orders.     Medications   lidocaine (XYLOCAINE) 2 % 15 mL, alum & mag hydroxide-simethicone (MAALOX) 15 mL GI Cocktail (30 mLs Oral Given 9/28/21 0102)        Assessments & Plan (with Medical Decision Making)   Patient's lungs were clear and his sats were normal. He appears no distress. Basic labs are unremarkable. I did get an EKG which shows sinus rhythm with first-degree AV block, occasional PVCs, right bundle branch block. I do not have an old EKG for comparison, unfortunately. We did try to get records from [facility], unfortunately, were unable to get an old EKG given that it is currently the middle of the night and medical records disclosed at the [facility]. Troponin was negative. Patient is does not describe chest pain, rather describes a burning sensation which she feels like is in his lungs. He does report he has had a lot of issues with GERD in the past. I did give him a GI cocktail without improvement. Covid was checked and negative. Chest x-ray unremarkable. The patient is very relieved by this. I did also check D-dimer, which is negative. I have low suspicion for PE, so D-dimer being negative makes this extremely unlikely. I did discuss with the patient that his EKG is abnormal, though I have no old for comparison. Patient does have a previous cardiac history. The fact that the patient has been symptomatic for the entire day and has a negative troponin is reassuring. Patient states that his primary concern was for  the possibility of Covid, he is very relieved that the Covid was negative. Certainly given that he has a cough, sounds more infectious, although I cannot conclusively rule out cardiac disease. The patient states that his shortness of breath is really minor, feels very comfortable with discharge, would prefer discharge home. He is strongly encouraged to follow-up with primary care this week, return immediately with any new or worsening symptoms, any other concerns. He verbalizes understanding and is agreeable to the plan.    Dictation Disclaimer: Some of this Note has been completed with voice-recognition dictation software. Although errors are generally corrected real-time, there is the potential for a rare error to be present in the completed chart.      I have reviewed the nursing notes. I have reviewed the findings, diagnosis, plan and need for follow up with the patient.    Discharge Medication List as of 9/28/2021  4:13 AM          Final diagnoses:   Cough   Dyspnea, unspecified type       --    [facility] EMERGENCY DEPARTMENT  9/27/2021     Amie Gordon MD  09/28/21 0436

## 2021-09-28 NOTE — ED TRIAGE NOTES
Pt c/o congestion and shortness of breath x1 day. Pt denies any fevers. Pt endorses intermittent non productive cough.

## 2021-10-23 ENCOUNTER — HEALTH MAINTENANCE LETTER (OUTPATIENT)
Age: 81
End: 2021-10-23

## 2022-02-14 ENCOUNTER — OFFICE VISIT (OUTPATIENT)
Dept: DERMATOLOGY | Facility: CLINIC | Age: 82
End: 2022-02-14
Payer: COMMERCIAL

## 2022-02-14 DIAGNOSIS — L71.9 ROSACEA: ICD-10-CM

## 2022-02-14 DIAGNOSIS — L73.9 FOLLICULITIS: ICD-10-CM

## 2022-02-14 PROCEDURE — 99213 OFFICE O/P EST LOW 20 MIN: CPT | Performed by: DERMATOLOGY

## 2022-02-14 RX ORDER — SULFAMETHOXAZOLE/TRIMETHOPRIM 800-160 MG
TABLET ORAL
Qty: 30 TABLET | Refills: 3 | Status: SHIPPED | OUTPATIENT
Start: 2022-02-14

## 2022-02-14 ASSESSMENT — PAIN SCALES - GENERAL: PAINLEVEL: NO PAIN (0)

## 2022-02-14 NOTE — NURSING NOTE
Dermatology Rooming Note    Tee Larkin's goals for this visit include:   Chief Complaint   Patient presents with     Derm Problem     Tee is here today for a 6 month recheck for penicillin, reports improvement     Yadira Wick, EMT

## 2022-02-14 NOTE — LETTER
2/14/2022       RE: Tee Larkin  [address]  [address]  [facility] [address]     Dear Colleague,    Thank you for referring your patient, Tee Larkin, to the [facility] [place] at [facility]. Please see a copy of my visit note below.    [facility] Dermatology Note  Encounter Date: Feb 14, 2022  Office Visit     Dermatology Problem List:  1. Pityrosporum Folliculitis with rosacea component, involvement of scalp and face.  - Pityrosporum biopsy proven many years ago.   - Current Tx: bactrim DS (half tab) daily, BPO wash every other day   - Monitoring labs drawn 8/16/21  2.  Seborrheic dermatitis  - Current Tx: 2% ketoconazole shampoo.  3. Inflamed seborrheic keratosis  - Cryotherapy to lesion on left ear on 8/3/18  4.  Papule left cheek 11/12/2019 improved 12/30/2019 but not fully resolved.    ____________________________________________    Assessment & Plan:     # Folliculitis/rosacea.- improved   * Independently interpreted results of a test performed by another physician/other qualified health care professional (not separately reported): CBC and BMP 1/29/22 with normal counts and creatinine    - decrease bactrim DS to half tab every other day    Follow-up: 6 month(s) in-person, or earlier for new or changing lesions    Staff:     Charlene Xiong MD  ____________________________________________    CC: Derm Problem (Tee is here today for a 6 month recheck for penicillin, reports improvement)    HPI:  Mr. Tee Larkin is a(n) 81 year old male who presents today as a return patient for follow up rosacea/ folliculitis.  He is doing very well with minimal outbreaks and would like to cut back further on his medication if he can.  He does avoid trigger factors.  He will be moving back to [place] in assisted living with his sister.   He did have a recent UTI with a visit to his doctor but is doing  well at this time.     Patient is otherwise feeling well, without additional skin concerns.     Labs Reviewed:  BMP and CBC 1/29/22    Physical Exam:  Vitals: There were no vitals taken for this visit.  SKIN: Focused examination of face and scalp was performed.  - No active pustules or inflammatory papules.   - No other lesions of concern on areas examined.     Medications:  Current Outpatient Medications   Medication     aspirin 81 MG tablet     benzoyl peroxide 5 % external liquid     clindamycin (CLEOCIN T) 1 % external lotion     clobetasol (TEMOVATE) 0.05 % external solution     ketoconazole (NIZORAL) 2 % shampoo     LOSARTAN POTASSIUM PO     metoprolol (LOPRESSOR) 25 MG tablet     nitroGLYCERIN (NITROSTAT) 0.4 MG SL tablet     Omeprazole 20 MG tablet     rosuvastatin (CRESTOR) 40 MG tablet     Shampoos (FREE & CLEAR) SHAM     sulfamethoxazole-trimethoprim (BACTRIM DS) 800-160 MG tablet     No current facility-administered medications for this visit.      Past Medical History:   Patient Active Problem List   Diagnosis     Other specified disease of hair and hair follicles     Past Medical History:   Diagnosis Date     Essential hypertension      Mycobacterium avium infection (H) 2018     S/P CABG (coronary artery bypass graft)     x2     Status post insertion of drug eluting coronary artery stent 2011       CC Stephan Weiner MD  [address] [facility] [address]  [place] [address] on close of this encounter.

## 2022-02-14 NOTE — PROGRESS NOTES
[facility] Dermatology Note  Encounter Date: Feb 14, 2022  Office Visit     Dermatology Problem List:  1. Pityrosporum Folliculitis with rosacea component, involvement of scalp and face.  - Pityrosporum biopsy proven many years ago.   - Current Tx: bactrim DS (half tab) daily, BPO wash every other day   - Monitoring labs drawn 8/16/21  2.  Seborrheic dermatitis  - Current Tx: 2% ketoconazole shampoo.  3. Inflamed seborrheic keratosis  - Cryotherapy to lesion on left ear on 8/3/18  4.  Papule left cheek 11/12/2019 improved 12/30/2019 but not fully resolved.    ____________________________________________    Assessment & Plan:     # Folliculitis/rosacea.- improved   * Independently interpreted results of a test performed by another physician/other qualified health care professional (not separately reported): CBC and BMP 1/29/22 with normal counts and creatinine    - decrease bactrim DS to half tab every other day    Follow-up: 6 month(s) in-person, or earlier for new or changing lesions    Staff:     Charlene Xiong MD  ____________________________________________    CC: Derm Problem (Tee is here today for a 6 month recheck for penicillin, reports improvement)    HPI:  Mr. Tee Larkin is a(n) 81 year old male who presents today as a return patient for follow up rosacea/ folliculitis.  He is doing very well with minimal outbreaks and would like to cut back further on his medication if he can.  He does avoid trigger factors.  He will be moving back to [place] in assisted living with his sister.   He did have a recent UTI with a visit to his doctor but is doing well at this time.     Patient is otherwise feeling well, without additional skin concerns.     Labs Reviewed:  BMP and CBC 1/29/22    Physical Exam:  Vitals: There were no vitals taken for this visit.  SKIN: Focused examination of face and scalp was performed.  - No active pustules or inflammatory papules.   - No other lesions of  concern on areas examined.     Medications:  Current Outpatient Medications   Medication     aspirin 81 MG tablet     benzoyl peroxide 5 % external liquid     clindamycin (CLEOCIN T) 1 % external lotion     clobetasol (TEMOVATE) 0.05 % external solution     ketoconazole (NIZORAL) 2 % shampoo     LOSARTAN POTASSIUM PO     metoprolol (LOPRESSOR) 25 MG tablet     nitroGLYCERIN (NITROSTAT) 0.4 MG SL tablet     Omeprazole 20 MG tablet     rosuvastatin (CRESTOR) 40 MG tablet     Shampoos (FREE & CLEAR) SHAM     sulfamethoxazole-trimethoprim (BACTRIM DS) 800-160 MG tablet     No current facility-administered medications for this visit.      Past Medical History:   Patient Active Problem List   Diagnosis     Other specified disease of hair and hair follicles     Past Medical History:   Diagnosis Date     Essential hypertension      Mycobacterium avium infection (H) 2018     S/P CABG (coronary artery bypass graft)     x2     Status post insertion of drug eluting coronary artery stent 2011       CC Stephan Weiner MD  [address] [facility] [address]  [place] [address] on close of this encounter.

## 2022-02-14 NOTE — PATIENT INSTRUCTIONS
Try taking the sulfamethoxazole-trimethoprim 1/2 tablet every other day.   Spontaneous, unlabored and symmetrical

## 2022-05-24 ENCOUNTER — LAB REQUISITION (OUTPATIENT)
Dept: LAB | Facility: CLINIC | Age: 82
End: 2022-05-24
Payer: COMMERCIAL

## 2022-05-24 DIAGNOSIS — U07.1 COVID-19: ICD-10-CM

## 2022-05-26 PROCEDURE — U0005 INFEC AGEN DETEC AMPLI PROBE: HCPCS | Mod: ORL | Performed by: INTERNAL MEDICINE

## 2022-05-27 LAB — SARS-COV-2 RNA RESP QL NAA+PROBE: NEGATIVE

## 2022-06-01 ENCOUNTER — LAB REQUISITION (OUTPATIENT)
Dept: LAB | Facility: CLINIC | Age: 82
End: 2022-06-01
Payer: COMMERCIAL

## 2022-06-01 DIAGNOSIS — U07.1 COVID-19: ICD-10-CM

## 2022-06-02 PROCEDURE — U0005 INFEC AGEN DETEC AMPLI PROBE: HCPCS | Mod: ORL | Performed by: INTERNAL MEDICINE

## 2022-06-03 LAB — SARS-COV-2 RNA RESP QL NAA+PROBE: NEGATIVE

## 2022-06-04 ENCOUNTER — HEALTH MAINTENANCE LETTER (OUTPATIENT)
Age: 82
End: 2022-06-04

## 2022-06-15 ENCOUNTER — LAB REQUISITION (OUTPATIENT)
Dept: LAB | Facility: CLINIC | Age: 82
End: 2022-06-15
Payer: COMMERCIAL

## 2022-06-15 DIAGNOSIS — U07.1 COVID-19: ICD-10-CM

## 2022-06-16 PROCEDURE — U0003 INFECTIOUS AGENT DETECTION BY NUCLEIC ACID (DNA OR RNA); SEVERE ACUTE RESPIRATORY SYNDROME CORONAVIRUS 2 (SARS-COV-2) (CORONAVIRUS DISEASE [COVID-19]), AMPLIFIED PROBE TECHNIQUE, MAKING USE OF HIGH THROUGHPUT TECHNOLOGIES AS DESCRIBED BY CMS-2020-01-R: HCPCS | Mod: ORL | Performed by: INTERNAL MEDICINE

## 2022-06-18 LAB — SARS-COV-2 RNA RESP QL NAA+PROBE: NEGATIVE

## 2022-06-22 ENCOUNTER — LAB REQUISITION (OUTPATIENT)
Dept: LAB | Facility: CLINIC | Age: 82
End: 2022-06-22
Payer: COMMERCIAL

## 2022-06-22 DIAGNOSIS — U07.1 COVID-19: ICD-10-CM

## 2022-06-23 PROCEDURE — U0005 INFEC AGEN DETEC AMPLI PROBE: HCPCS | Mod: ORL | Performed by: INTERNAL MEDICINE

## 2022-06-24 LAB — SARS-COV-2 RNA RESP QL NAA+PROBE: NEGATIVE

## 2022-08-11 ENCOUNTER — TRANSCRIBE ORDERS (OUTPATIENT)
Dept: OTHER | Age: 82
End: 2022-08-11

## 2022-08-11 DIAGNOSIS — M54.42 MIDLINE LOW BACK PAIN WITH LEFT-SIDED SCIATICA, UNSPECIFIED CHRONICITY: Primary | ICD-10-CM

## 2022-08-15 ENCOUNTER — OFFICE VISIT (OUTPATIENT)
Dept: DERMATOLOGY | Facility: CLINIC | Age: 82
End: 2022-08-15
Payer: COMMERCIAL

## 2022-08-15 DIAGNOSIS — L73.9 FOLLICULITIS: ICD-10-CM

## 2022-08-15 DIAGNOSIS — L71.9 ROSACEA: ICD-10-CM

## 2022-08-15 DIAGNOSIS — L21.9 DERMATITIS, SEBORRHEIC: Primary | ICD-10-CM

## 2022-08-15 PROCEDURE — 99214 OFFICE O/P EST MOD 30 MIN: CPT | Performed by: DERMATOLOGY

## 2022-08-15 RX ORDER — KETOCONAZOLE 20 MG/ML
SHAMPOO TOPICAL
Qty: 120 ML | Refills: 11 | Status: SHIPPED | OUTPATIENT
Start: 2022-08-15 | End: 2023-02-20

## 2022-08-15 ASSESSMENT — PAIN SCALES - GENERAL: PAINLEVEL: NO PAIN (0)

## 2022-08-15 NOTE — PATIENT INSTRUCTIONS
Plan for Tee:  -Stop taking the bactrim pill.  -Use the ketoconazole shampoo twice a week.   -Use clindamycin as needed for breakouts.  -Use Benzoyl peroxide as needed for breakouts.

## 2022-08-15 NOTE — LETTER
8/15/2022       RE: Tee Larkin  [address]  [facility] [address]     Dear Colleague,    Thank you for referring your patient, Tee Larkin, to the [facility] DERMATOLOGY CLINIC [place] at [facility]. Please see a copy of my visit note below.    [facility] Dermatology Note  Encounter Date: Aug 15, 2022  Office Visit     Dermatology Problem List:  1. 1. Pityrosporum Folliculitis with rosacea component, involvement of scalp and face.  - Pityrosporum biopsy proven many years ago.   - Current Tx: bactrim DS (half tab) daily, BPO wash every other day - Will stop bactrim 8/15/22  - Monitoring labs drawn 8/16/21  2.  Seborrheic dermatitis  - Current Tx: 2% ketoconazole shampoo.  3. Inflamed seborrheic keratosis  - Cryotherapy to lesion on left ear on 8/3/18  .    ____________________________________________    Assessment & Plan:    # Folliculitis/Rosacea:   Has been doing well with his regimen. He has a few scattered pustules on the back. He does not have significant redness on the face.   -Patient will stop oral bactrim.   -Patient will use clindamycin for breakouts.   -Patient will use benzoyl peroxide with a breakouts.      # Seborrheic dermatitis:  He continues to have areas of dryness. He will be more diligent with use of shampoo.   -He will use ketoconazole shampoo twice a week.    Follow-up: 6 month(s) in-person, or earlier for new or changing lesions    Staff and Medical Student:     Mamie Patino, MS4    Patient seen with attending physician, Dr. Xiong.     I was present with the medical student who participated in the service and in the documentation of the note. I have verified the history and personally performed the physical exam and medical decision making. I agree with the assessment and plan of care as documented in the note.  Charlene Xiong MD    ____________________________________________    CC: Skin  Check (Pt is here for a skin check)    HPI:  Mr. Tee Larkin is a(n) 82 year old male who presents today as a return patient for rosacea and skin check.   Patient has pustules on head hasn't had that for quite a while. He has moved to the [facility] since Jan 2022 and these has helped him to avoid his food triggers. He is taking bactrim half tab every other day. He has not had any issues with this medication. He has not been using the benzoyl peroxide wash. He denies scalp flakiness and itchiness.     The patient is also here for a skin check. He has no new moles or spots of concern.       Patient is otherwise feeling well, without additional skin concerns.    Physical Exam:  Vitals: There were no vitals taken for this visit.  Gen: Well appearing male.  SKIN: Full skin, which includes the head/face, both arms, chest, back, abdomen,both legs, genitalia and/or groin buttocks, digits and/or nails, was examined.  -Face is nonerythematous without telangiectasias.   -Deep red, regular, widespread papules on the chest, back and arms consistent with cherry hemangiomas.  -Widespread stuck on, waxy, papules consistent with seborrheic keratosis.    -Widespread light brown, macules on sun exposed areas consistent with solar lentigenes.    -Scattered pustules in the back.   - No other lesions of concern on areas examined.     Medications:  Current Outpatient Medications   Medication     aspirin 81 MG tablet     ketoconazole (NIZORAL) 2 % shampoo     LOSARTAN POTASSIUM PO     metoprolol (LOPRESSOR) 25 MG tablet     nitroGLYCERIN (NITROSTAT) 0.4 MG SL tablet     Omeprazole 20 MG tablet     rosuvastatin (CRESTOR) 40 MG tablet     Shampoos (FREE & CLEAR) SHAM     sulfamethoxazole-trimethoprim (BACTRIM DS) 800-160 MG tablet     benzoyl peroxide 5 % external liquid     clindamycin (CLEOCIN T) 1 % external lotion     clobetasol (TEMOVATE) 0.05 % external solution     No current facility-administered medications for  this visit.      Past Medical History:   Patient Active Problem List   Diagnosis     Other specified disease of hair and hair follicles     Past Medical History:   Diagnosis Date     Essential hypertension      Mycobacterium avium infection (H) 2018     S/P CABG (coronary artery bypass graft)     x2     Status post insertion of drug eluting coronary artery stent 2011        CC Stephan Weiner MD  [address]  [address]  [place] [address] on close of this encounter.

## 2022-08-15 NOTE — NURSING NOTE
Chief Complaint   Patient presents with     Skin Check     Pt is here for a skin check     Juan M Bonilla, EMT

## 2022-08-15 NOTE — PROGRESS NOTES
[facility] Dermatology Note  Encounter Date: Aug 15, 2022  Office Visit     Dermatology Problem List:  1. 1. Pityrosporum Folliculitis with rosacea component, involvement of scalp and face.  - Pityrosporum biopsy proven many years ago.   - Current Tx: bactrim DS (half tab) daily, BPO wash every other day - Will stop bactrim 8/15/22  - Monitoring labs drawn 8/16/21  2.  Seborrheic dermatitis  - Current Tx: 2% ketoconazole shampoo.  3. Inflamed seborrheic keratosis  - Cryotherapy to lesion on left ear on 8/3/18  .    ____________________________________________    Assessment & Plan:    # Folliculitis/Rosacea:   Has been doing well with his regimen. He has a few scattered pustules on the back. He does not have significant redness on the face.   -Patient will stop oral bactrim.   -Patient will use clindamycin for breakouts.   -Patient will use benzoyl peroxide with a breakouts.      # Seborrheic dermatitis:  He continues to have areas of dryness. He will be more diligent with use of shampoo.   -He will use ketoconazole shampoo twice a week.    Follow-up: 6 month(s) in-person, or earlier for new or changing lesions    Staff and Medical Student:     Mamie Patino, MS4    Patient seen with attending physician, Dr. Xiong.     I was present with the medical student who participated in the service and in the documentation of the note. I have verified the history and personally performed the physical exam and medical decision making. I agree with the assessment and plan of care as documented in the note.  Charlene Xiong MD    ____________________________________________    CC: Skin Check (Pt is here for a skin check)    HPI:  Mr. Tee Larkin is a(n) 82 year old male who presents today as a return patient for rosacea and skin check.   Patient has pustules on head hasn't had that for quite a while. He has moved to the [facility] since Jan 2022 and these has helped him to avoid his food  triggers. He is taking bactrim half tab every other day. He has not had any issues with this medication. He has not been using the benzoyl peroxide wash. He denies scalp flakiness and itchiness.     The patient is also here for a skin check. He has no new moles or spots of concern.       Patient is otherwise feeling well, without additional skin concerns.    Physical Exam:  Vitals: There were no vitals taken for this visit.  Gen: Well appearing male.  SKIN: Full skin, which includes the head/face, both arms, chest, back, abdomen,both legs, genitalia and/or groin buttocks, digits and/or nails, was examined.  -Face is nonerythematous without telangiectasias.   -Deep red, regular, widespread papules on the chest, back and arms consistent with cherry hemangiomas.  -Widespread stuck on, waxy, papules consistent with seborrheic keratosis.    -Widespread light brown, macules on sun exposed areas consistent with solar lentigenes.    -Scattered pustules in the back.   - No other lesions of concern on areas examined.     Medications:  Current Outpatient Medications   Medication     aspirin 81 MG tablet     ketoconazole (NIZORAL) 2 % shampoo     LOSARTAN POTASSIUM PO     metoprolol (LOPRESSOR) 25 MG tablet     nitroGLYCERIN (NITROSTAT) 0.4 MG SL tablet     Omeprazole 20 MG tablet     rosuvastatin (CRESTOR) 40 MG tablet     Shampoos (FREE & CLEAR) SHAM     sulfamethoxazole-trimethoprim (BACTRIM DS) 800-160 MG tablet     benzoyl peroxide 5 % external liquid     clindamycin (CLEOCIN T) 1 % external lotion     clobetasol (TEMOVATE) 0.05 % external solution     No current facility-administered medications for this visit.      Past Medical History:   Patient Active Problem List   Diagnosis     Other specified disease of hair and hair follicles     Past Medical History:   Diagnosis Date     Essential hypertension      Mycobacterium avium infection (H) 2018     S/P CABG (coronary artery bypass graft)     x2     Status post insertion  of drug eluting coronary artery stent 2011        CC Stephan Weiner MD  [address]  [address]  [place] [address] on close of this encounter.

## 2022-08-19 ENCOUNTER — THERAPY VISIT (OUTPATIENT)
Dept: PHYSICAL THERAPY | Facility: CLINIC | Age: 82
End: 2022-08-19
Payer: COMMERCIAL

## 2022-08-19 DIAGNOSIS — G89.29 CHRONIC RIGHT-SIDED LOW BACK PAIN WITH RIGHT-SIDED SCIATICA: ICD-10-CM

## 2022-08-19 DIAGNOSIS — M54.42 MIDLINE LOW BACK PAIN WITH LEFT-SIDED SCIATICA, UNSPECIFIED CHRONICITY: ICD-10-CM

## 2022-08-19 DIAGNOSIS — M54.41 CHRONIC RIGHT-SIDED LOW BACK PAIN WITH RIGHT-SIDED SCIATICA: ICD-10-CM

## 2022-08-19 PROCEDURE — 97161 PT EVAL LOW COMPLEX 20 MIN: CPT | Mod: GP | Performed by: PHYSICAL THERAPIST

## 2022-08-19 PROCEDURE — 97110 THERAPEUTIC EXERCISES: CPT | Mod: GP | Performed by: PHYSICAL THERAPIST

## 2022-08-19 NOTE — PROGRESS NOTES
[facility]    OUTPATIENT Physical Therapy ORTHOPEDIC EVALUATION  PLAN OF TREATMENT FOR OUTPATIENT REHABILITATION  (COMPLETE FOR INITIAL CLAIMS ONLY)  Patient's Last Name, First Name, M.I.  YOB: 1940  Tee Larkin    Provider s Name:  [facility]   Medical Record No.  [id number]   Start of Care Date:  08/19/22   Onset Date:   03/01/22   Type:     _X__PT   ___OT Medical Diagnosis:    Encounter Diagnoses   Name Primary?    Midline low back pain with left-sided sciatica, unspecified chronicity     Chronic right-sided low back pain with right-sided sciatica         Treatment Diagnosis:  right low back and leg pain        Goals:     08/19/22 0500   Body Part   Goals listed below are for low back/hip   Goal #1   Goal #1 standing   Previous Functional Level No restrictions   Current Functional Level Minutes patient can stand   Performance level 10   STG Target Performance Minutes patient will be able to stand   Performance level 20   Rationale for personal hygiene   Due date 09/16/22   LTG Target Performance Minutes patient will be able to stand   Performance Level 30   Rationale for personal hygiene   Due date 10/14/22       Therapy Frequency:  1x/week  Predicted Duration of Therapy Intervention:  6-8 weeks    Sindy Oneal, PT                 I CERTIFY THE NEED FOR THESE SERVICES FURNISHED UNDER        THIS PLAN OF TREATMENT AND WHILE UNDER MY CARE     (Physician attestation of this document indicates review and certification of the therapy plan).                     Certification Date From:  08/19/22   Certification Date To:  11/16/22    Referring Provider:  Stephan Weiner    Initial Assessment        See Epic Evaluation SOC Date: 08/19/22

## 2022-08-19 NOTE — PROGRESS NOTES
Physical Therapy Initial Evaluation  Subjective:  Patient presents to outpatient PT with several month h/o right low back/hip and knee pain (insidious onset).  Patient moved into senior living in January, and feels change in eating and exercise habits have contributed to pain.  Patient is eating more, and is walking less.  Also reports h/o right knee pain, and this started hurting more recently during walking, resulting in decreased walking.  Symptoms worse with weight bearing.  Sleep is ok if patient sleeps on left side.  Right low back symptoms feel nerve (tingling).    The history is provided by the patient. No  was used.   Patient Health History  Tee Larkin being seen for right low back, hip, knee pain.     Problem began: 3/1/2022.   Problem occurred: unknown          Red flags:  None as reported by patient.          Other medications details: see EPIC.    Current occupation is retired.   Primary job tasks include:  Other.                  Therapist Generated HPI Evaluation         Type of problem:  Lumbar.    This is a chronic condition.  Condition occurred with:  Insidious onset.  Where condition occurred: for unknown reasons.  Patient reports pain:  Lumbar spine right.  Pain is described as aching and is intermittent.  Pain radiates to:  Knee right and gluteals right.   Since onset symptoms are unchanged.  Associated symptoms:  Loss of motion/stiffness and tingling. Symptoms are exacerbated by walking, standing and certain positions  and relieved by rest and analgesics.      Barriers include:  None as reported by patient.                        Objective:  System         Lumbar/SI Evaluation    Lumbar Myotomes:    T12-L3 (Hip Flex):  Left: 5    Right: 4+  L2-4 (Quads):  Left:  5    Right:  5  L4 (Ankle DF):  Left:  5    Right:  4+            Neural Tension/Mobility:  Lumbar:  Normal                                                       Knee Evaluation:  ROM:    AROM      Extension:  Left:    Right:  4  Flexion: Left:   Right: 112                            Mack Lumbar Evaluation    Posture:  Sitting: good  Standing: good  Lordosis: Reduced  Lateral Shift: no  Correction of Posture: no effect    Movement Loss:  Flexion (Flex): min  Extension (EXT): mod and pain  Side [place] R (SG R): mod and pain  Side Glide L (SG L): mod and pain  Test Movements:    EIS:   Repeat EIS: During: decreases  After: better  Mechanical Response: IncROM            Conclusion: derangement  Principle of Treatment:      Extension: trial standing lumbar extension, monitor pain response    Other: right DF strength improved after repeated lumbar extension                                       ROS    Assessment/Plan:    Patient is a 82 year old male with lumbar complaints.    Patient has the following significant findings with corresponding treatment plan.                Diagnosis 1:  Right low back and leg   Pain -  manual therapy, self management, education, directional preference exercise and home program  Decreased ROM/flexibility - manual therapy, therapeutic exercise and home program  Decreased joint mobility - manual therapy, therapeutic exercise and home program  Decreased strength - therapeutic exercise, therapeutic activities and home program  Impaired gait - gait training and home program    Therapy Evaluation Codes:   1) History comprised of:   Personal factors that impact the plan of care:      None.    Comorbidity factors that impact the plan of care are:      None.     Medications impacting care: see EPIC.  2) Examination of Body Systems comprised of:   Body structures and functions that impact the plan of care:      Knee.   Activity limitations that impact the plan of care are:      Sitting, Standing, Walking and Laying down.  3) Clinical presentation characteristics are:   Stable/Uncomplicated.  4) Decision-Making    Low complexity using standardized patient assessment instrument and/or measureable  assessment of functional outcome.  Cumulative Therapy Evaluation is: Low complexity.    Previous and current functional limitations:  (See Goal Flow Sheet for this information)    Short term and Long term goals: (See Goal Flow Sheet for this information)     Communication ability:  Patient appears to be able to clearly communicate and understand verbal and written communication and follow directions correctly.  Treatment Explanation - The following has been discussed with the patient:   RX ordered/plan of care  Anticipated outcomes  Possible risks and side effects  This patient would benefit from PT intervention to resume normal activities.   Rehab potential is good.    Frequency:  1 X week, once daily  Duration:  for 6-8 weeks  Discharge Plan:  Achieve all LTG.  Independent in home treatment program.  Reach maximal therapeutic benefit.    Please refer to the daily flowsheet for treatment today, total treatment time and time spent performing 1:1 timed codes.

## 2022-08-24 ENCOUNTER — THERAPY VISIT (OUTPATIENT)
Dept: PHYSICAL THERAPY | Facility: CLINIC | Age: 82
End: 2022-08-24
Payer: COMMERCIAL

## 2022-08-24 DIAGNOSIS — G89.29 CHRONIC RIGHT-SIDED LOW BACK PAIN WITH RIGHT-SIDED SCIATICA: Primary | ICD-10-CM

## 2022-08-24 DIAGNOSIS — M54.41 CHRONIC RIGHT-SIDED LOW BACK PAIN WITH RIGHT-SIDED SCIATICA: Primary | ICD-10-CM

## 2022-08-24 PROCEDURE — 97110 THERAPEUTIC EXERCISES: CPT | Mod: GP | Performed by: PHYSICAL THERAPIST

## 2022-09-07 ENCOUNTER — THERAPY VISIT (OUTPATIENT)
Dept: PHYSICAL THERAPY | Facility: CLINIC | Age: 82
End: 2022-09-07
Payer: COMMERCIAL

## 2022-09-07 DIAGNOSIS — G89.29 CHRONIC RIGHT-SIDED LOW BACK PAIN WITH RIGHT-SIDED SCIATICA: Primary | ICD-10-CM

## 2022-09-07 DIAGNOSIS — M54.41 CHRONIC RIGHT-SIDED LOW BACK PAIN WITH RIGHT-SIDED SCIATICA: Primary | ICD-10-CM

## 2022-09-07 PROCEDURE — 97110 THERAPEUTIC EXERCISES: CPT | Mod: GP | Performed by: PHYSICAL THERAPIST

## 2022-10-04 ENCOUNTER — DOCUMENTATION ONLY (OUTPATIENT)
Dept: OTHER | Facility: CLINIC | Age: 82
End: 2022-10-04

## 2022-10-09 ENCOUNTER — HEALTH MAINTENANCE LETTER (OUTPATIENT)
Age: 82
End: 2022-10-09

## 2023-02-18 ENCOUNTER — HEALTH MAINTENANCE LETTER (OUTPATIENT)
Age: 83
End: 2023-02-18

## 2023-02-20 ENCOUNTER — OFFICE VISIT (OUTPATIENT)
Dept: DERMATOLOGY | Facility: CLINIC | Age: 83
End: 2023-02-20
Payer: COMMERCIAL

## 2023-02-20 DIAGNOSIS — L21.9 SEBORRHEIC DERMATITIS: Primary | ICD-10-CM

## 2023-02-20 DIAGNOSIS — L73.9 FOLLICULITIS: ICD-10-CM

## 2023-02-20 DIAGNOSIS — L71.9 ROSACEA: ICD-10-CM

## 2023-02-20 PROCEDURE — 99213 OFFICE O/P EST LOW 20 MIN: CPT | Performed by: DERMATOLOGY

## 2023-02-20 RX ORDER — CLINDAMYCIN PHOSPHATE 10 UG/ML
LOTION TOPICAL DAILY
Qty: 60 ML | Refills: 11 | Status: SHIPPED | OUTPATIENT
Start: 2023-02-20

## 2023-02-20 RX ORDER — SULFAMETHOXAZOLE/TRIMETHOPRIM 800-160 MG
TABLET ORAL
Qty: 30 TABLET | Refills: 3 | Status: CANCELLED | OUTPATIENT
Start: 2023-02-20

## 2023-02-20 RX ORDER — CLOBETASOL PROPIONATE 0.5 MG/ML
SOLUTION TOPICAL
Qty: 59 ML | Refills: 6 | Status: CANCELLED | OUTPATIENT
Start: 2023-02-20

## 2023-02-20 RX ORDER — KETOCONAZOLE 20 MG/ML
SHAMPOO TOPICAL
Qty: 120 ML | Refills: 11 | Status: SHIPPED | OUTPATIENT
Start: 2023-02-20

## 2023-02-20 ASSESSMENT — PAIN SCALES - GENERAL: PAINLEVEL: NO PAIN (0)

## 2023-02-20 NOTE — LETTER
2/20/2023       RE: Tee Larkin  [address]  [address] [facility] [address]     Dear Colleague,    Thank you for referring your patient, Tee Larkin, to the [facility] DERMATOLOGY CLINIC [place] at [facility]. Please see a copy of my visit note below.    [facility] Dermatology Note  Encounter Date: Feb 20, 2023  Office Visit     Dermatology Problem List:  1. Pityrosporum Folliculitis with rosacea component, involvement of scalp and face.  - Pityrosporum biopsy proven many years ago.   - Current Tx: BPO wash every other day - Stopped bactrim 8/15/22  - Monitoring labs last drawn 8/16/21    2.  Seborrheic dermatitis  - Current Tx: 2% ketoconazole shampoo.    3. Inflamed seborrheic keratosis  - Cryotherapy to lesion on left ear on 8/3/18  ____________________________________________    Assessment & Plan:    1. Rosacea, stable. Has feels like he has been doing well with his regimen. No significant redness on the face.   - Continue using clindamycin for breakouts.   - Continue using benzoyl peroxide with breakouts.      2. Seborrheic dermatitis  - Continue using 2% ketoconazole shampoo with showers.    Procedures Performed:   None    Follow-up: 6 month(s) in-person, or earlier for new or changing lesions    Staff and Medical Student:   Jennifer Vallejo, MS3 seen and staffed with Charlene Xiong MD  I was present with the medical student who participated in the service and in the documentation of the note. I have verified the history and personally performed the physical exam and medical decision making. I agree with the assessment and plan of care as documented in the note.  Charlene Xiong MD    ____________________________________________    CC: Skin Check (Areas of concern include scalp)    HPI:  Mr. Tee Larkin is a(n) 83 year old male who presents today as a return patient for full body skin examination. Has  concern about a scab on the top of his head. He states that he is using the ketoconazole shampoo using every day and benzoyl peroxide once a week. He feels like his rosacea is very well controlled.     Patient is otherwise feeling well, without additional skin concerns.    Labs:  None reviewed.    Physical Exam:  Vitals: There were no vitals taken for this visit.  SKIN: Total skin excluding the undergarment areas was performed. The exam included the head/face, neck, both arms, chest, back, abdomen, both legs, digits and/or nails.   - On the top of the head, there is a rough area of raised pale scale with a SK appearance on dermoscopy  - In the upper central back, there is a scar at the area of prior cyst removal where a 1cm nodule with punctum is present  - There are scattered bright red discrete papules on the back and chest  - The skin of the scalp, legs, and back is diffusely dry  - No other lesions of concern on areas examined.     Medications:  Current Outpatient Medications   Medication     aspirin 81 MG tablet     benzoyl peroxide 5 % external liquid     clindamycin (CLEOCIN T) 1 % external lotion     clobetasol (TEMOVATE) 0.05 % external solution     ketoconazole (NIZORAL) 2 % external shampoo     LOSARTAN POTASSIUM PO     metoprolol (LOPRESSOR) 25 MG tablet     nitroGLYCERIN (NITROSTAT) 0.4 MG SL tablet     Omeprazole 20 MG tablet     rosuvastatin (CRESTOR) 40 MG tablet     Shampoos (FREE & CLEAR) SHAM     sulfamethoxazole-trimethoprim (BACTRIM DS) 800-160 MG tablet     No current facility-administered medications for this visit.      Past Medical History:   Patient Active Problem List   Diagnosis     Other specified disease of hair and hair follicles     Folliculitis     Rosacea     Dermatitis, seborrheic     Chronic right-sided low back pain with right-sided sciatica     Past Medical History:   Diagnosis Date     Essential hypertension      Mycobacterium avium infection (H) 2018     S/P CABG (coronary  artery bypass graft)     x2     Status post insertion of drug eluting coronary artery stent 2011        CC Stephan Weiner MD  [address]  [address]  [place] [address] on close of this encounter.

## 2023-02-20 NOTE — PROGRESS NOTES
[facility] Dermatology Note  Encounter Date: Feb 20, 2023  Office Visit     Dermatology Problem List:  1. Pityrosporum Folliculitis with rosacea component, involvement of scalp and face.  - Pityrosporum biopsy proven many years ago.   - Current Tx: BPO wash every other day - Stopped bactrim 8/15/22  - Monitoring labs last drawn 8/16/21    2.  Seborrheic dermatitis  - Current Tx: 2% ketoconazole shampoo.    3. Inflamed seborrheic keratosis  - Cryotherapy to lesion on left ear on 8/3/18  ____________________________________________    Assessment & Plan:    1. Rosacea, stable. Has feels like he has been doing well with his regimen. No significant redness on the face.   - Continue using clindamycin for breakouts.   - Continue using benzoyl peroxide with breakouts.      2. Seborrheic dermatitis  - Continue using 2% ketoconazole shampoo with showers.    Procedures Performed:   None    Follow-up: 6 month(s) in-person, or earlier for new or changing lesions    Staff and Medical Student:   Jennifer Vallejo, MS3 seen and staffed with Charlene Xiong MD  I was present with the medical student who participated in the service and in the documentation of the note. I have verified the history and personally performed the physical exam and medical decision making. I agree with the assessment and plan of care as documented in the note.  Charlene Xiong MD    ____________________________________________    CC: Skin Check (Areas of concern include scalp)    HPI:  Mr. Tee Larkin is a(n) 83 year old male who presents today as a return patient for full body skin examination. Has concern about a scab on the top of his head. He states that he is using the ketoconazole shampoo using every day and benzoyl peroxide once a week. He feels like his rosacea is very well controlled.     Patient is otherwise feeling well, without additional skin concerns.    Labs:  None reviewed.    Physical Exam:  Vitals: There were no  vitals taken for this visit.  SKIN: Total skin excluding the undergarment areas was performed. The exam included the head/face, neck, both arms, chest, back, abdomen, both legs, digits and/or nails.   - On the top of the head, there is a rough area of raised pale scale with a SK appearance on dermoscopy  - In the upper central back, there is a scar at the area of prior cyst removal where a 1cm nodule with punctum is present  - There are scattered bright red discrete papules on the back and chest  - The skin of the scalp, legs, and back is diffusely dry  - No other lesions of concern on areas examined.     Medications:  Current Outpatient Medications   Medication     aspirin 81 MG tablet     benzoyl peroxide 5 % external liquid     clindamycin (CLEOCIN T) 1 % external lotion     clobetasol (TEMOVATE) 0.05 % external solution     ketoconazole (NIZORAL) 2 % external shampoo     LOSARTAN POTASSIUM PO     metoprolol (LOPRESSOR) 25 MG tablet     nitroGLYCERIN (NITROSTAT) 0.4 MG SL tablet     Omeprazole 20 MG tablet     rosuvastatin (CRESTOR) 40 MG tablet     Shampoos (FREE & CLEAR) SHAM     sulfamethoxazole-trimethoprim (BACTRIM DS) 800-160 MG tablet     No current facility-administered medications for this visit.      Past Medical History:   Patient Active Problem List   Diagnosis     Other specified disease of hair and hair follicles     Folliculitis     Rosacea     Dermatitis, seborrheic     Chronic right-sided low back pain with right-sided sciatica     Past Medical History:   Diagnosis Date     Essential hypertension      Mycobacterium avium infection (H) 2018     S/P CABG (coronary artery bypass graft)     x2     Status post insertion of drug eluting coronary artery stent 2011         Stephan Weiner MD  [address] [facility] [address]  [place] [address] on close of this encounter.

## 2023-02-20 NOTE — NURSING NOTE
Chief Complaint   Patient presents with     Skin Check     Areas of concern include scalp     Juan M Bonilla, EMT

## 2023-03-27 PROBLEM — G89.29 CHRONIC RIGHT-SIDED LOW BACK PAIN WITH RIGHT-SIDED SCIATICA: Status: RESOLVED | Noted: 2022-08-19 | Resolved: 2023-03-27

## 2023-03-27 PROBLEM — M54.41 CHRONIC RIGHT-SIDED LOW BACK PAIN WITH RIGHT-SIDED SCIATICA: Status: RESOLVED | Noted: 2022-08-19 | Resolved: 2023-03-27

## 2023-08-21 ENCOUNTER — OFFICE VISIT (OUTPATIENT)
Dept: DERMATOLOGY | Facility: CLINIC | Age: 83
End: 2023-08-21
Payer: COMMERCIAL

## 2023-08-21 DIAGNOSIS — L82.1 SK (SEBORRHEIC KERATOSIS): ICD-10-CM

## 2023-08-21 DIAGNOSIS — L71.9 ROSACEA: ICD-10-CM

## 2023-08-21 DIAGNOSIS — L21.9 SEBORRHEIC DERMATITIS: ICD-10-CM

## 2023-08-21 DIAGNOSIS — L73.9 FOLLICULITIS: Primary | ICD-10-CM

## 2023-08-21 PROCEDURE — 99213 OFFICE O/P EST LOW 20 MIN: CPT | Performed by: DERMATOLOGY

## 2023-08-21 RX ORDER — AMLODIPINE BESYLATE 5 MG/1
5 TABLET ORAL DAILY
COMMUNITY
Start: 2023-06-04

## 2023-08-21 ASSESSMENT — PAIN SCALES - GENERAL: PAINLEVEL: NO PAIN (0)

## 2023-08-21 NOTE — NURSING NOTE
Chief Complaint   Patient presents with    Skin Check     No areas of concern     Juan M Bonilla, EMT

## 2023-08-21 NOTE — LETTER
8/21/2023       RE: Tee Larkin  [address]  [address] [facility] [address]     Dear Colleague,    Thank you for referring your patient, Tee Larkin, to the [facility] DERMATOLOGY CLINIC [place] at [facility]. Please see a copy of my visit note below.    [facility] Dermatology Note  Encounter Date: Aug 21, 2023  Office Visit     Dermatology Problem List:  1. Pityrosporum Folliculitis with rosacea component, involvement of scalp and face.  - Pityrosporum biopsy proven many years ago.   - Current Tx: BPO wash every other day with clindamycin lotion - Stopped bactrim 8/15/22  - Monitoring labs last drawn 8/16/21     2.  Seborrheic dermatitis  - Current Tx: 2% ketoconazole shampoo.     3. Inflamed seborrheic keratosis  - Cryotherapy to lesion on left ear on 8/3/18    ____________________________________________    Assessment & Plan:     # Pityrosporum Folliculitis with rosacea component, involvement of scalp and face.  - few inflammatory papules today  - BPO wash every other day and clindamycin lotion for breakouts    # Seb derm- well controlled  - Ketoconazole shampoo when showering    # benign findings: SK, cherry angiomas, nevi      Follow-up: 1 year(s) in-person, or earlier for new or changing lesions    Staff:     Charlene Xiong MD  ____________________________________________    CC: Skin Check (No areas of concern)    HPI:  Mr. Tee Larkin is a(n) 83 year old male who presents today as a return patient for several issues.  He has moved to a care facility and is accompanied by his niece today who helps with the history.  Overall doing very well.  Notes some bumps in the scalp.  No specific tender or bleeding lesions    Patient is otherwise feeling well, without additional skin concerns.     Labs Reviewed:  N/A    Physical Exam:  Vitals: There were no vitals taken for this visit.  SKIN: Total skin excluding the  undergarment areas was performed. The exam included the head/face, neck, both arms, chest, back, abdomen, both legs, digits and/or nails.   - waxy stuck on papules including the lesions of concern in the scalp  - few inflammatory papules of the scalp and trunk  -cherry angiomas  - nevi with no atypia on dermoscopy  - No other lesions of concern on areas examined.     Medications:  Current Outpatient Medications   Medication    amLODIPine (NORVASC) 5 MG tablet    aspirin 81 MG tablet    benzoyl peroxide 5 % external liquid    clindamycin (CLEOCIN T) 1 % external lotion    clobetasol (TEMOVATE) 0.05 % external solution    ketoconazole (NIZORAL) 2 % external shampoo    LOSARTAN POTASSIUM PO    metoprolol (LOPRESSOR) 25 MG tablet    nitroGLYCERIN (NITROSTAT) 0.4 MG SL tablet    Omeprazole 20 MG tablet    rosuvastatin (CRESTOR) 40 MG tablet    Shampoos (FREE & CLEAR) SHAM    sulfamethoxazole-trimethoprim (BACTRIM DS) 800-160 MG tablet     No current facility-administered medications for this visit.      Past Medical History:   Patient Active Problem List   Diagnosis    Other specified disease of hair and hair follicles    Folliculitis    Rosacea    Dermatitis, seborrheic     Past Medical History:   Diagnosis Date    Essential hypertension     Mycobacterium avium infection (H) 2018    S/P CABG (coronary artery bypass graft)     x2    Status post insertion of drug eluting coronary artery stent 2011       CC No referring provider defined for this encounter. on close of this encounter.

## 2023-08-21 NOTE — PROGRESS NOTES
[facility] Dermatology Note  Encounter Date: Aug 21, 2023  Office Visit     Dermatology Problem List:  1. Pityrosporum Folliculitis with rosacea component, involvement of scalp and face.  - Pityrosporum biopsy proven many years ago.   - Current Tx: BPO wash every other day with clindamycin lotion - Stopped bactrim 8/15/22  - Monitoring labs last drawn 8/16/21     2.  Seborrheic dermatitis  - Current Tx: 2% ketoconazole shampoo.     3. Inflamed seborrheic keratosis  - Cryotherapy to lesion on left ear on 8/3/18    ____________________________________________    Assessment & Plan:     # Pityrosporum Folliculitis with rosacea component, involvement of scalp and face.  - few inflammatory papules today  - BPO wash every other day and clindamycin lotion for breakouts    # Seb derm- well controlled  - Ketoconazole shampoo when showering    # benign findings: SK, cherry angiomas, nevi      Follow-up: 1 year(s) in-person, or earlier for new or changing lesions    Staff:     Charlene Xiong MD  ____________________________________________    CC: Skin Check (No areas of concern)    HPI:  Mr. Tee Larkin is a(n) 83 year old male who presents today as a return patient for several issues.  He has moved to a care facility and is accompanied by his niece today who helps with the history.  Overall doing very well.  Notes some bumps in the scalp.  No specific tender or bleeding lesions    Patient is otherwise feeling well, without additional skin concerns.     Labs Reviewed:  N/A    Physical Exam:  Vitals: There were no vitals taken for this visit.  SKIN: Total skin excluding the undergarment areas was performed. The exam included the head/face, neck, both arms, chest, back, abdomen, both legs, digits and/or nails.   - waxy stuck on papules including the lesions of concern in the scalp  - few inflammatory papules of the scalp and trunk  -cherry angiomas  - nevi with no atypia on dermoscopy  - No other  lesions of concern on areas examined.     Medications:  Current Outpatient Medications   Medication    amLODIPine (NORVASC) 5 MG tablet    aspirin 81 MG tablet    benzoyl peroxide 5 % external liquid    clindamycin (CLEOCIN T) 1 % external lotion    clobetasol (TEMOVATE) 0.05 % external solution    ketoconazole (NIZORAL) 2 % external shampoo    LOSARTAN POTASSIUM PO    metoprolol (LOPRESSOR) 25 MG tablet    nitroGLYCERIN (NITROSTAT) 0.4 MG SL tablet    Omeprazole 20 MG tablet    rosuvastatin (CRESTOR) 40 MG tablet    Shampoos (FREE & CLEAR) SHAM    sulfamethoxazole-trimethoprim (BACTRIM DS) 800-160 MG tablet     No current facility-administered medications for this visit.      Past Medical History:   Patient Active Problem List   Diagnosis    Other specified disease of hair and hair follicles    Folliculitis    Rosacea    Dermatitis, seborrheic     Past Medical History:   Diagnosis Date    Essential hypertension     Mycobacterium avium infection (H) 2018    S/P CABG (coronary artery bypass graft)     x2    Status post insertion of drug eluting coronary artery stent 2011       CC No referring provider defined for this encounter. on close of this encounter.

## 2023-08-25 ENCOUNTER — TELEPHONE (OUTPATIENT)
Dept: DERMATOLOGY | Facility: CLINIC | Age: 83
End: 2023-08-25
Payer: COMMERCIAL

## 2024-01-06 ENCOUNTER — HEALTH MAINTENANCE LETTER (OUTPATIENT)
Age: 84
End: 2024-01-06

## 2024-09-09 ENCOUNTER — OFFICE VISIT (OUTPATIENT)
Dept: DERMATOLOGY | Facility: CLINIC | Age: 84
End: 2024-09-09
Payer: COMMERCIAL

## 2024-09-09 DIAGNOSIS — L21.9 SEBORRHEIC DERMATITIS: ICD-10-CM

## 2024-09-09 DIAGNOSIS — L71.9 ROSACEA: ICD-10-CM

## 2024-09-09 DIAGNOSIS — L73.9 FOLLICULITIS: Primary | ICD-10-CM

## 2024-09-09 DIAGNOSIS — L82.1 SK (SEBORRHEIC KERATOSIS): ICD-10-CM

## 2024-09-09 PROCEDURE — 99213 OFFICE O/P EST LOW 20 MIN: CPT | Performed by: DERMATOLOGY

## 2024-09-09 ASSESSMENT — PAIN SCALES - GENERAL: PAINLEVEL: NO PAIN (0)

## 2024-09-09 NOTE — PROCEDURES
[facility] Dermatology Note  Encounter Date: Sep 9, 2024  Office Visit     Dermatology Problem List:  1.  Seborrheic keratosis  2.  Cherry angioma  3.  Dermatofibroma  4.  Pityrosporum folliculitis with rosacea component, scalp    ____________________________________________    Assessment & Plan:    #Pityrosporum Folliculitis with rosacea component, involvement of scalp and face.  - Pityrosporum biopsy proven many years ago.   - Current Tx: BPO wash as needed + clindamycin lotion    # Seborrheic dermatitis.   -2% ketoconazole shampoo    # benign findings: SK, cherry angiomas, dermatofibroma, nevi      Follow-up: 1 year(s) in-person, or earlier for new or changing lesions    Staff and Medical Student:     Nolberto Avina, MS3    ***  ____________________________________________    CC: Derm Problem (Skin check- No areas of concern. Has recently figured out that his diet is very important. )    HPI:  Mr. Tee Larkin is a(n) 84 year old male who presents today as a return patient for skin check and pityrosporum folliculitis.    Patient has noticed improvement in scalp lesions, but condition comes and goes- especially notices flares with consumption of certain foods (nuts and chocolate). Patient uses ketoconazole shampoo once per week or more. Benzyl peroxide PRN (last used a few weeks ago) and has not used clindamycin lotion.     Patient is otherwise feeling well, without additional skin concerns.    Labs:  None reviewed.    Physical Exam:  Vitals: There were no vitals taken for this visit.  SKIN: Total skin excluding the undergarment areas was performed. The exam included the head/face, neck, both arms, chest, back, abdomen, both legs, digits and/or nails.   -  multiple bright red small papules on trunk and legs  - nevi with no atypia on dermoscopy  - waxy stuck on papules including a few lesions on the scalp  - minimally hyperpigmented slightly flaky firm papules located on both legs  with no overt excoriation or erythema.  Retraction sign noted.  - No other lesions of concern on areas examined.     Medications:  Current Outpatient Medications   Medication Sig Dispense Refill    amLODIPine (NORVASC) 5 MG tablet Take 5 mg by mouth daily      aspirin 81 MG tablet Take 81 mg by mouth daily.      benzoyl peroxide 5 % external liquid Use daily in the shower on your upper back, neck, chest and posterior scalp 226 g 11    clindamycin (CLEOCIN T) 1 % external lotion Apply topically daily To be applied after the shower on new spots 60 mL 11    clobetasol (TEMOVATE) 0.05 % external solution Apply several drops over scalp to spread think layer-15 minutes before shower Twice weekly 59 mL 6    ketoconazole (NIZORAL) 2 % external shampoo Use as shampoo twice a week. 120 mL 11    LOSARTAN POTASSIUM PO Take  by mouth.      metoprolol (LOPRESSOR) 25 MG tablet Take 25 mg by mouth 2 times daily.      nitroGLYCERIN (NITROSTAT) 0.4 MG SL tablet Place 0.4 mg under the tongue every 5 minutes as needed.      Omeprazole 20 MG tablet Take 20 mg by mouth daily.      rosuvastatin (CRESTOR) 40 MG tablet Take 40 mg by mouth daily.      Shampoos (FREE & CLEAR) SHAM Externally apply  topically. As directed      sulfamethoxazole-trimethoprim (BACTRIM DS) 800-160 MG tablet Take 1/2 tab daily (Patient not taking: Reported on 8/21/2023) 30 tablet 3     No current facility-administered medications for this visit.      Past Medical History:   Patient Active Problem List   Diagnosis    Other specified disease of hair and hair follicles    Folliculitis    Rosacea    Dermatitis, seborrheic     Past Medical History:   Diagnosis Date    Essential hypertension     Mycobacterium avium infection (H) 2018    S/P CABG (coronary artery bypass graft)     x2    Status post insertion of drug eluting coronary artery stent 2011        CC Referred Self, MD  No address on file on close of this encounter.

## 2024-09-09 NOTE — NURSING NOTE
Dermatology Rooming Note    Tee Larkin's goals for this visit include:   Chief Complaint   Patient presents with    Derm Problem     Skin check- No areas of concern. Has recently figured out that his diet is very important.      Jomar Gomes, EMT  Clinic Support  [facility]     [phone]    Employed by [employer]

## 2024-09-09 NOTE — LETTER
9/9/2024       RE: Tee Larkin  [address]  [address] [facility] [address]     Dear Colleague,    Thank you for referring your patient, Tee Larkin, to the [facility] DERMATOLOGY CLINIC [place] at [facility]. Please see a copy of my visit note below.    [facility] Dermatology Note  Encounter Date: Sep 9, 2024  Office Visit     Dermatology Problem List:  1. Pityrosporum Folliculitis with rosacea component, involvement of scalp and face.  - Pityrosporum biopsy proven many years ago.   - Current Tx: BPO wash every other day with clindamycin lotion - Stopped bactrim 8/15/22  - Monitoring labs last drawn 8/16/21     2.  Seborrheic dermatitis  - Current Tx: 2% ketoconazole shampoo.     3. Inflamed seborrheic keratosis  - Cryotherapy to lesion on left ear on 8/3/18    ____________________________________________    Assessment & Plan:     # Pityrosporum Folliculitis with rosacea component, involvement of scalp and face.  Continues to do well with minor flares.  Avoids triggers when able  - few inflammatory papules today  - BPO wash every other day and clindamycin lotion for breakouts     # Seb derm- well controlled  - Ketoconazole shampoo when showering     # benign findings: SK, cherry angiomas, nevi        Follow-up: 1 year(s) in-person, or earlier for new or changing lesions     Staff:      Cahrlene Xiong MD  ____________________________________________    CC: Derm Problem (Skin check- No areas of concern. Has recently figured out that his diet is very important. )    HPI:  Mr. Tee Larkin is a(n) 84 year old male who presents today as a return patient for a skin check and follow up of folliculitis/rosacea.  Doing well with mild flares and uses his medication.  His niece is present and helps with his history.   No tender or bleeding lesions.  Does avoid nuts and chocolate as he feels these cause flares.    Patient  is otherwise feeling well, without additional skin concerns.     Labs Reviewed:  N/A    Physical Exam:  Vitals: There were no vitals taken for this visit.  SKIN: Total skin excluding the undergarment areas was performed. The exam included the head/face, neck, both arms, chest, back, abdomen, both legs, digits and/or nails.   - few pustules of beard and scalp  - cherry angiomas  - nevi with no atypia on dermoscopy  - lentigo  - waxy stuck on papules   - No other lesions of concern on areas examined.     Medications:  Current Outpatient Medications   Medication Sig Dispense Refill     amLODIPine (NORVASC) 5 MG tablet Take 5 mg by mouth daily       aspirin 81 MG tablet Take 81 mg by mouth daily.       benzoyl peroxide 5 % external liquid Use daily in the shower on your upper back, neck, chest and posterior scalp 226 g 11     clindamycin (CLEOCIN T) 1 % external lotion Apply topically daily To be applied after the shower on new spots 60 mL 11     clobetasol (TEMOVATE) 0.05 % external solution Apply several drops over scalp to spread think layer-15 minutes before shower Twice weekly 59 mL 6     ketoconazole (NIZORAL) 2 % external shampoo Use as shampoo twice a week. 120 mL 11     LOSARTAN POTASSIUM PO Take  by mouth.       metoprolol (LOPRESSOR) 25 MG tablet Take 25 mg by mouth 2 times daily.       nitroGLYCERIN (NITROSTAT) 0.4 MG SL tablet Place 0.4 mg under the tongue every 5 minutes as needed.       Omeprazole 20 MG tablet Take 20 mg by mouth daily.       rosuvastatin (CRESTOR) 40 MG tablet Take 40 mg by mouth daily.       Shampoos (FREE & CLEAR) SHAM Externally apply  topically. As directed       sulfamethoxazole-trimethoprim (BACTRIM DS) 800-160 MG tablet Take 1/2 tab daily (Patient not taking: Reported on 8/21/2023) 30 tablet 3     No current facility-administered medications for this visit.      Past Medical History:   Patient Active Problem List   Diagnosis     Other specified disease of hair and hair follicles      Folliculitis     Rosacea     Dermatitis, seborrheic     Past Medical History:   Diagnosis Date     Essential hypertension      Mycobacterium avium infection (H) 2018     S/P CABG (coronary artery bypass graft)     x2     Status post insertion of drug eluting coronary artery stent 2011       CC Referred Self, MD  No address on file on close of this encounter.    Again, thank you for allowing me to participate in the care of your patient.      Sincerely,    Charlene Xiong MD

## 2024-09-10 NOTE — PROGRESS NOTES
[facility] Dermatology Note  Encounter Date: Sep 9, 2024  Office Visit     Dermatology Problem List:  1. Pityrosporum Folliculitis with rosacea component, involvement of scalp and face.  - Pityrosporum biopsy proven many years ago.   - Current Tx: BPO wash every other day with clindamycin lotion - Stopped bactrim 8/15/22  - Monitoring labs last drawn 8/16/21     2.  Seborrheic dermatitis  - Current Tx: 2% ketoconazole shampoo.     3. Inflamed seborrheic keratosis  - Cryotherapy to lesion on left ear on 8/3/18    ____________________________________________    Assessment & Plan:     # Pityrosporum Folliculitis with rosacea component, involvement of scalp and face.  Continues to do well with minor flares.  Avoids triggers when able  - few inflammatory papules today  - BPO wash every other day and clindamycin lotion for breakouts     # Seb derm- well controlled  - Ketoconazole shampoo when showering     # benign findings: SK, cherry angiomas, nevi        Follow-up: 1 year(s) in-person, or earlier for new or changing lesions     Staff:      Charlene Xiong MD  ____________________________________________    CC: Derm Problem (Skin check- No areas of concern. Has recently figured out that his diet is very important. )    HPI:  Mr. Tee Larkin is a(n) 84 year old male who presents today as a return patient for a skin check and follow up of folliculitis/rosacea.  Doing well with mild flares and uses his medication.  His niece is present and helps with his history.   No tender or bleeding lesions.  Does avoid nuts and chocolate as he feels these cause flares.    Patient is otherwise feeling well, without additional skin concerns.     Labs Reviewed:  N/A    Physical Exam:  Vitals: There were no vitals taken for this visit.  SKIN: Total skin excluding the undergarment areas was performed. The exam included the head/face, neck, both arms, chest, back, abdomen, both legs, digits and/or nails.   - few  pustules of beard and scalp  - cherry angiomas  - nevi with no atypia on dermoscopy  - lentigo  - waxy stuck on papules   - No other lesions of concern on areas examined.     Medications:  Current Outpatient Medications   Medication Sig Dispense Refill    amLODIPine (NORVASC) 5 MG tablet Take 5 mg by mouth daily      aspirin 81 MG tablet Take 81 mg by mouth daily.      benzoyl peroxide 5 % external liquid Use daily in the shower on your upper back, neck, chest and posterior scalp 226 g 11    clindamycin (CLEOCIN T) 1 % external lotion Apply topically daily To be applied after the shower on new spots 60 mL 11    clobetasol (TEMOVATE) 0.05 % external solution Apply several drops over scalp to spread think layer-15 minutes before shower Twice weekly 59 mL 6    ketoconazole (NIZORAL) 2 % external shampoo Use as shampoo twice a week. 120 mL 11    LOSARTAN POTASSIUM PO Take  by mouth.      metoprolol (LOPRESSOR) 25 MG tablet Take 25 mg by mouth 2 times daily.      nitroGLYCERIN (NITROSTAT) 0.4 MG SL tablet Place 0.4 mg under the tongue every 5 minutes as needed.      Omeprazole 20 MG tablet Take 20 mg by mouth daily.      rosuvastatin (CRESTOR) 40 MG tablet Take 40 mg by mouth daily.      Shampoos (FREE & CLEAR) SHAM Externally apply  topically. As directed      sulfamethoxazole-trimethoprim (BACTRIM DS) 800-160 MG tablet Take 1/2 tab daily (Patient not taking: Reported on 8/21/2023) 30 tablet 3     No current facility-administered medications for this visit.      Past Medical History:   Patient Active Problem List   Diagnosis    Other specified disease of hair and hair follicles    Folliculitis    Rosacea    Dermatitis, seborrheic     Past Medical History:   Diagnosis Date    Essential hypertension     Mycobacterium avium infection (H) 2018    S/P CABG (coronary artery bypass graft)     x2    Status post insertion of drug eluting coronary artery stent 2011       CC Referred Self, MD  No address on file on close of this  encounter.

## 2025-01-25 ENCOUNTER — HEALTH MAINTENANCE LETTER (OUTPATIENT)
Age: 85
End: 2025-01-25